# Patient Record
Sex: FEMALE | Race: WHITE | NOT HISPANIC OR LATINO | Employment: OTHER | URBAN - METROPOLITAN AREA
[De-identification: names, ages, dates, MRNs, and addresses within clinical notes are randomized per-mention and may not be internally consistent; named-entity substitution may affect disease eponyms.]

---

## 2023-08-18 ENCOUNTER — HOSPITAL ENCOUNTER (OUTPATIENT)
Dept: DATA CONVERSION | Facility: HOSPITAL | Age: 73
Discharge: HOME | End: 2023-08-18
Payer: MEDICARE

## 2023-08-18 DIAGNOSIS — M81.0 AGE-RELATED OSTEOPOROSIS WITHOUT CURRENT PATHOLOGICAL FRACTURE: ICD-10-CM

## 2023-09-16 PROBLEM — M19.90 OSTEOARTHRITIS: Status: ACTIVE | Noted: 2023-09-16

## 2023-09-16 PROBLEM — R82.998 LEUKOCYTES IN URINE: Status: ACTIVE | Noted: 2023-09-16

## 2023-09-16 PROBLEM — M06.9 RHEUMATOID ARTHRITIS (MULTI): Status: ACTIVE | Noted: 2023-09-16

## 2023-09-16 PROBLEM — I10 HYPERTENSION: Status: ACTIVE | Noted: 2023-09-16

## 2023-09-16 PROBLEM — K21.9 GERD (GASTROESOPHAGEAL REFLUX DISEASE): Status: ACTIVE | Noted: 2023-09-16

## 2023-09-16 PROBLEM — K63.89 MASS OF CECUM: Status: ACTIVE | Noted: 2023-09-16

## 2023-09-16 PROBLEM — I47.9 PAROXYSMAL TACHYCARDIA (MULTI): Status: ACTIVE | Noted: 2023-09-16

## 2023-09-16 RX ORDER — OXYBUTYNIN CHLORIDE 15 MG/1
15 TABLET, EXTENDED RELEASE ORAL DAILY
COMMUNITY

## 2023-09-16 RX ORDER — LISINOPRIL 40 MG/1
40 TABLET ORAL DAILY
COMMUNITY
End: 2024-04-15 | Stop reason: SDUPTHER

## 2023-09-16 RX ORDER — MONTELUKAST SODIUM 4 MG/1
1 TABLET, CHEWABLE ORAL DAILY
COMMUNITY

## 2023-09-16 RX ORDER — CHOLECALCIFEROL (VITAMIN D3) 25 MCG
25 TABLET ORAL DAILY
COMMUNITY

## 2023-09-16 RX ORDER — TRAMADOL HYDROCHLORIDE 50 MG/1
TABLET ORAL
COMMUNITY
End: 2023-11-07 | Stop reason: ALTCHOICE

## 2023-09-16 RX ORDER — DICYCLOMINE HYDROCHLORIDE 10 MG/1
20 CAPSULE ORAL 3 TIMES DAILY
COMMUNITY

## 2023-09-16 RX ORDER — NAPROXEN SODIUM 220 MG/1
81 TABLET, FILM COATED ORAL DAILY
COMMUNITY

## 2023-09-16 RX ORDER — LANSOPRAZOLE 30 MG/1
30 TABLET, ORALLY DISINTEGRATING, DELAYED RELEASE ORAL DAILY
COMMUNITY
End: 2024-03-27 | Stop reason: SDUPTHER

## 2023-09-16 RX ORDER — METOPROLOL TARTRATE 50 MG/1
50 TABLET ORAL
COMMUNITY

## 2023-09-16 RX ORDER — LEFLUNOMIDE 20 MG/1
20 TABLET ORAL DAILY
COMMUNITY
Start: 2015-09-28

## 2023-09-16 RX ORDER — FERROUS SULFATE 325(65) MG
1 TABLET, DELAYED RELEASE (ENTERIC COATED) ORAL DAILY
COMMUNITY
End: 2023-11-07 | Stop reason: ALTCHOICE

## 2023-09-16 RX ORDER — FOLIC ACID 0.4 MG
0.4 TABLET ORAL DAILY
COMMUNITY

## 2023-09-16 RX ORDER — AMLODIPINE BESYLATE 5 MG/1
5 TABLET ORAL DAILY
COMMUNITY
End: 2024-04-15 | Stop reason: SDUPTHER

## 2023-09-16 RX ORDER — METHOTREXATE 2.5 MG/1
6 TABLET ORAL
COMMUNITY

## 2023-09-16 RX ORDER — MELOXICAM 15 MG/1
15 TABLET ORAL DAILY
COMMUNITY

## 2023-11-07 ENCOUNTER — OFFICE VISIT (OUTPATIENT)
Dept: PRIMARY CARE | Facility: CLINIC | Age: 73
End: 2023-11-07
Payer: MEDICARE

## 2023-11-07 VITALS
HEIGHT: 65 IN | SYSTOLIC BLOOD PRESSURE: 158 MMHG | BODY MASS INDEX: 23.99 KG/M2 | HEART RATE: 64 BPM | TEMPERATURE: 97.1 F | OXYGEN SATURATION: 96 % | DIASTOLIC BLOOD PRESSURE: 86 MMHG | WEIGHT: 144 LBS

## 2023-11-07 DIAGNOSIS — I47.9 PAROXYSMAL TACHYCARDIA (MULTI): ICD-10-CM

## 2023-11-07 DIAGNOSIS — M05.742 RHEUMATOID ARTHRITIS INVOLVING BOTH HANDS WITH POSITIVE RHEUMATOID FACTOR (MULTI): ICD-10-CM

## 2023-11-07 DIAGNOSIS — M05.741 RHEUMATOID ARTHRITIS INVOLVING BOTH HANDS WITH POSITIVE RHEUMATOID FACTOR (MULTI): ICD-10-CM

## 2023-11-07 DIAGNOSIS — I10 PRIMARY HYPERTENSION: Primary | ICD-10-CM

## 2023-11-07 PROBLEM — E03.9 HYPOTHYROIDISM: Status: ACTIVE | Noted: 2023-11-07

## 2023-11-07 PROBLEM — E78.00 PURE HYPERCHOLESTEROLEMIA: Status: ACTIVE | Noted: 2023-11-07

## 2023-11-07 PROBLEM — N39.46 MIXED STRESS AND URGE URINARY INCONTINENCE: Status: ACTIVE | Noted: 2023-11-07

## 2023-11-07 PROBLEM — E55.9 VITAMIN D DEFICIENCY: Status: ACTIVE | Noted: 2023-11-07

## 2023-11-07 PROBLEM — M06.9 RHEUMATOID ARTHRITIS INVOLVING BOTH HANDS (MULTI): Status: ACTIVE | Noted: 2023-11-07

## 2023-11-07 PROCEDURE — 3079F DIAST BP 80-89 MM HG: CPT | Performed by: INTERNAL MEDICINE

## 2023-11-07 PROCEDURE — 1036F TOBACCO NON-USER: CPT | Performed by: INTERNAL MEDICINE

## 2023-11-07 PROCEDURE — 3077F SYST BP >= 140 MM HG: CPT | Performed by: INTERNAL MEDICINE

## 2023-11-07 PROCEDURE — 1126F AMNT PAIN NOTED NONE PRSNT: CPT | Performed by: INTERNAL MEDICINE

## 2023-11-07 PROCEDURE — 1159F MED LIST DOCD IN RCRD: CPT | Performed by: INTERNAL MEDICINE

## 2023-11-07 PROCEDURE — 99213 OFFICE O/P EST LOW 20 MIN: CPT | Performed by: INTERNAL MEDICINE

## 2023-11-07 ASSESSMENT — ENCOUNTER SYMPTOMS
DEPRESSION: 0
LOSS OF SENSATION IN FEET: 0
OCCASIONAL FEELINGS OF UNSTEADINESS: 0

## 2023-11-07 ASSESSMENT — PATIENT HEALTH QUESTIONNAIRE - PHQ9
2. FEELING DOWN, DEPRESSED OR HOPELESS: NOT AT ALL
SUM OF ALL RESPONSES TO PHQ9 QUESTIONS 1 AND 2: 0
1. LITTLE INTEREST OR PLEASURE IN DOING THINGS: NOT AT ALL

## 2023-11-07 ASSESSMENT — PAIN SCALES - GENERAL: PAINLEVEL: 0-NO PAIN

## 2023-11-07 NOTE — PROGRESS NOTES
[facility]: [facility]  PROGRESS NOTE      Amanda Hopkins is a 73 y.o. female that is presenting today for BP fu.    Assessment/Plan   Diagnoses and all orders for this visit:  Primary hypertension    BP office readings continues to be slightly up  with current treatment with better home readings ( [facility])  Continue the same Tx meds and bring home readings with her next visit on the new BP machine (it's reading today is slightly higher than the office one).  Rheumatoid arthritis involving both hands with positive rheumatoid factor (CMS/HCC)    Fair control / under Rheum care  Paroxysmal tachycardia (CMS/HCC)     Stable and controlled   Subjective   Patient is here for FU on her HTN and reviewing her home BP readings it was in in 130's /70-80's with her old BP machine which broke down and have new one she brought with her but does not have home readings checked with it except for one reading 148/82. Been taking her BP meds as ordered.    Review of Systems   All pertinent POSITIVES as noted per HPI.  All other systems have been reviewed and are NEGATIVE and /or Noncontributory to this patient current visit or complaint.  Objective   Vitals:    11/07/23 1054   BP: 158/86   Pulse: 64   Temp: 36.2 °C (97.1 °F)   SpO2: 96%      Body mass index is 23.96 kg/m².  Physical Exam  Vitals and nursing note reviewed.   Constitutional:       Appearance: Normal appearance.   HENT:      Head: Normocephalic and atraumatic.   Neck:      Vascular: No carotid bruit.   Cardiovascular:      Rate and Rhythm: Normal rate and regular rhythm.      Pulses: Normal pulses.      Heart sounds: Normal heart sounds.   Pulmonary:      Effort: Pulmonary effort is normal.      Breath sounds: Normal breath sounds.   Abdominal:      General: Abdomen is flat. Bowel sounds are normal.      Palpations: Abdomen is soft.   Musculoskeletal:         General: No swelling. Normal range of motion.      Cervical back: Neck supple.  "  Lymphadenopathy:      Cervical: No cervical adenopathy.   Skin:     General: Skin is warm and dry.   Neurological:      Mental Status: She is alert.   Psychiatric:         Mood and Affect: Mood normal.     Diagnostic Results   Lab Results   Component Value Date    GLUCOSE 96 12/10/2020    CALCIUM 8.8 12/10/2020     12/10/2020    K 3.8 12/10/2020    CO2 26 12/10/2020     12/10/2020    BUN 15 12/10/2020    CREATININE 0.5 12/10/2020     No results found for: \"ALT\", \"AST\", \"GGT\", \"ALKPHOS\", \"BILITOT\"  Lab Results   Component Value Date    WBC 4.9 12/10/2020    HGB 11.1 (L) 12/10/2020    HCT 36.2 12/10/2020    MCV 98.4 12/10/2020     12/10/2020     No results found for: \"CHOL\"  No results found for: \"HDL\"  No results found for: \"LDLCALC\"  No results found for: \"TRIG\"  No components found for: \"CHOLHDL\"  No results found for: \"HGBA1C\"  Other labs not included in the list above were reviewed either before or during this encounter.    History    Past Medical History:   Diagnosis Date    Other specified diseases of intestine 01/27/2017    Colonic mass    Personal history of other diseases of the musculoskeletal system and connective tissue     History of osteoarthritis    Supraventricular tachycardia     Supraventricular tachycardia by ECG     Past Surgical History:   Procedure Laterality Date    OTHER SURGICAL HISTORY  01/27/2017    Laryngeal Surgery    OTHER SURGICAL HISTORY  03/11/2020    Colectomy    OTHER SURGICAL HISTORY  04/10/2017    Laparoscopy Partial Colectomy    TONSILLECTOMY  01/27/2017    Tonsillectomy    TOTAL ABDOMINAL HYSTERECTOMY W/ BILATERAL SALPINGOOPHORECTOMY  01/27/2017    Total Abdominal Hysterectomy With Removal Of Both Ovaries     Family History   Problem Relation Name Age of Onset    Other (Cerebral hemorrhage) Mother      Heart disease Father      Other (OHS) Brother       Social History     Socioeconomic History    Marital status:      Spouse name: Not on file    Number " of children: Not on file    Years of education: Not on file    Highest education level: Not on file   Occupational History    Not on file   Tobacco Use    Smoking status: Never     Passive exposure: Never    Smokeless tobacco: Never   Vaping Use    Vaping Use: Never used   Substance and Sexual Activity    Alcohol use: Yes    Drug use: Never    Sexual activity: Not on file   Other Topics Concern    Not on file   Social History Narrative    Not on file     Social Determinants of Health     Financial Resource Strain: Not on file   Food Insecurity: Not on file   Transportation Needs: Not on file   Physical Activity: Not on file   Stress: Not on file   Social Connections: Not on file   Intimate Partner Violence: Not on file   Housing Stability: Not on file     Allergies   Allergen Reactions    Morphine Unknown    Propoxyphene N-Acetaminophen Unknown     Current Outpatient Medications on File Prior to Visit   Medication Sig Dispense Refill    amLODIPine (Norvasc) 5 mg tablet Take 1 tablet (5 mg) by mouth once daily.      aspirin 81 mg chewable tablet Chew 1 tablet (81 mg) once daily.      cholecalciferol (Vitamin D3) 25 MCG (1000 UT) tablet Take 1 tablet (25 mcg) by mouth once daily.      colestipol (Colestid) 1 gram tablet Take 1 tablet (1 g) by mouth once daily.      dicyclomine (Bentyl) 10 mg capsule Take 2 capsules (20 mg) by mouth 3 times a day. prn      folic acid (Folvite) 400 mcg tablet Take 1 tablet (0.4 mg) by mouth once daily.      glucos sul 2KCl/msm/chond/C/Mn (GLUCOSAMINE CHONDROITIN ORAL) Take 1 tablet by mouth once daily. Glucosamine Chondr 1500 Complx      lansoprazole (Prevacid SoluTab) 30 mg disintegrating tablet Take 1 tablet (30 mg) by mouth once daily.      leflunomide (Arava) 20 mg tablet Take 1 tablet (20 mg) by mouth once daily.      lisinopril 40 mg tablet Take 1 tablet (40 mg) by mouth once daily.      meloxicam (Mobic) 15 mg tablet Take 1 tablet (15 mg) by mouth once daily.      methotrexate  (Trexall) 2.5 mg tablet Take 6 tablets (15 mg total) by mouth 1 (one) time per week.      metoprolol tartrate (Lopressor) 50 mg tablet Take 1 tablet by mouth 2 times a day with meals.      oxybutynin XL (Ditropan-XL) 15 mg 24 hr tablet Take 1 tablet (15 mg) by mouth once daily.      [DISCONTINUED] ferrous sulfate 325 (65 Fe) MG EC tablet Take 1 tablet (325 mg) by mouth once daily.      [DISCONTINUED] traMADol (Ultram) 50 mg tablet traMADol HCl - 50 MG Oral Tablet   Refills: 0       Active       No current facility-administered medications on file prior to visit.     Immunization History   Administered Date(s) Administered    Influenza, Unspecified 10/19/2009, 11/05/2012, 12/08/2020    Novel influenza-H1N1-09, preservative-free 11/22/2009    Pfizer Purple Cap SARS-CoV-2 03/23/2021, 04/13/2021    Zoster vaccine, recombinant, adult (SHINGRIX) 02/08/2021    Zoster, Unspecified 12/08/2020     Patient's medical history was reviewed and updated either before or during this encounter.       Sami Teran MD

## 2023-11-28 ENCOUNTER — LAB (OUTPATIENT)
Dept: LAB | Facility: LAB | Age: 73
End: 2023-11-28
Payer: MEDICARE

## 2023-11-28 DIAGNOSIS — E78.00 PURE HYPERCHOLESTEROLEMIA, UNSPECIFIED: ICD-10-CM

## 2023-11-28 DIAGNOSIS — R73.9 HYPERGLYCEMIA, UNSPECIFIED: ICD-10-CM

## 2023-11-28 DIAGNOSIS — R39.9 UNSPECIFIED SYMPTOMS AND SIGNS INVOLVING THE GENITOURINARY SYSTEM: ICD-10-CM

## 2023-11-28 DIAGNOSIS — E03.9 HYPOTHYROIDISM, UNSPECIFIED: ICD-10-CM

## 2023-11-28 DIAGNOSIS — Z01.89 ENCOUNTER FOR OTHER SPECIFIED SPECIAL EXAMINATIONS: Primary | ICD-10-CM

## 2023-11-28 DIAGNOSIS — E55.9 VITAMIN D DEFICIENCY, UNSPECIFIED: ICD-10-CM

## 2023-11-28 LAB
25(OH)D3 SERPL-MCNC: 30 NG/ML (ref 31–100)
ALBUMIN SERPL-MCNC: 4.5 G/DL (ref 3.5–5)
ALP BLD-CCNC: 35 U/L (ref 35–125)
ALT SERPL-CCNC: 16 U/L (ref 5–40)
ANION GAP SERPL CALC-SCNC: 12 MMOL/L
AST SERPL-CCNC: 20 U/L (ref 5–40)
BASOPHILS # BLD AUTO: 0.08 X10*3/UL (ref 0–0.1)
BASOPHILS NFR BLD AUTO: 1.7 %
BILIRUB DIRECT SERPL-MCNC: <0.2 MG/DL (ref 0–0.2)
BILIRUB SERPL-MCNC: 0.4 MG/DL (ref 0.1–1.2)
BUN SERPL-MCNC: 21 MG/DL (ref 8–25)
CALCIUM SERPL-MCNC: 9.2 MG/DL (ref 8.5–10.4)
CHLORIDE SERPL-SCNC: 105 MMOL/L (ref 97–107)
CHOLEST SERPL-MCNC: 229 MG/DL (ref 133–200)
CHOLEST/HDLC SERPL: 3.1 {RATIO}
CO2 SERPL-SCNC: 25 MMOL/L (ref 24–31)
CREAT SERPL-MCNC: 0.6 MG/DL (ref 0.4–1.6)
EOSINOPHIL # BLD AUTO: 0.27 X10*3/UL (ref 0–0.4)
EOSINOPHIL NFR BLD AUTO: 5.9 %
ERYTHROCYTE [DISTWIDTH] IN BLOOD BY AUTOMATED COUNT: 13.1 % (ref 11.5–14.5)
EST. AVERAGE GLUCOSE BLD GHB EST-MCNC: 97 MG/DL
GFR SERPL CREATININE-BSD FRML MDRD: >90 ML/MIN/1.73M*2
GLUCOSE SERPL-MCNC: 97 MG/DL (ref 65–99)
HBA1C MFR BLD: 5 %
HCT VFR BLD AUTO: 35.7 % (ref 36–46)
HDLC SERPL-MCNC: 73 MG/DL
HGB BLD-MCNC: 11.2 G/DL (ref 12–16)
IMM GRANULOCYTES # BLD AUTO: 0.01 X10*3/UL (ref 0–0.5)
IMM GRANULOCYTES NFR BLD AUTO: 0.2 % (ref 0–0.9)
LDLC SERPL CALC-MCNC: 140 MG/DL (ref 65–130)
LYMPHOCYTES # BLD AUTO: 1.69 X10*3/UL (ref 0.8–3)
LYMPHOCYTES NFR BLD AUTO: 36.8 %
MCH RBC QN AUTO: 30.3 PG (ref 26–34)
MCHC RBC AUTO-ENTMCNC: 31.4 G/DL (ref 32–36)
MCV RBC AUTO: 97 FL (ref 80–100)
MONOCYTES # BLD AUTO: 0.54 X10*3/UL (ref 0.05–0.8)
MONOCYTES NFR BLD AUTO: 11.8 %
NEUTROPHILS # BLD AUTO: 2 X10*3/UL (ref 1.6–5.5)
NEUTROPHILS NFR BLD AUTO: 43.6 %
NRBC BLD-RTO: 0 /100 WBCS (ref 0–0)
PLATELET # BLD AUTO: 245 X10*3/UL (ref 150–450)
POTASSIUM SERPL-SCNC: 4.3 MMOL/L (ref 3.4–5.1)
PROT SERPL-MCNC: 6.7 G/DL (ref 5.9–7.9)
RBC # BLD AUTO: 3.7 X10*6/UL (ref 4–5.2)
SODIUM SERPL-SCNC: 142 MMOL/L (ref 133–145)
TRIGL SERPL-MCNC: 81 MG/DL (ref 40–150)
TSH SERPL DL<=0.05 MIU/L-ACNC: 1.3 MIU/L (ref 0.27–4.2)
WBC # BLD AUTO: 4.6 X10*3/UL (ref 4.4–11.3)

## 2023-11-28 PROCEDURE — 80061 LIPID PANEL: CPT

## 2023-11-28 PROCEDURE — 36415 COLL VENOUS BLD VENIPUNCTURE: CPT

## 2023-11-28 PROCEDURE — 83036 HEMOGLOBIN GLYCOSYLATED A1C: CPT

## 2023-11-28 PROCEDURE — 82248 BILIRUBIN DIRECT: CPT

## 2023-11-28 PROCEDURE — 82306 VITAMIN D 25 HYDROXY: CPT

## 2023-11-28 PROCEDURE — 80053 COMPREHEN METABOLIC PANEL: CPT

## 2023-11-28 PROCEDURE — 85025 COMPLETE CBC W/AUTO DIFF WBC: CPT

## 2023-11-28 PROCEDURE — 84443 ASSAY THYROID STIM HORMONE: CPT

## 2023-12-05 ENCOUNTER — OFFICE VISIT (OUTPATIENT)
Dept: PRIMARY CARE | Facility: CLINIC | Age: 73
End: 2023-12-05
Payer: MEDICARE

## 2023-12-05 VITALS
BODY MASS INDEX: 24.49 KG/M2 | SYSTOLIC BLOOD PRESSURE: 154 MMHG | DIASTOLIC BLOOD PRESSURE: 73 MMHG | HEART RATE: 78 BPM | WEIGHT: 147 LBS | TEMPERATURE: 97.6 F | OXYGEN SATURATION: 96 % | HEIGHT: 65 IN

## 2023-12-05 DIAGNOSIS — I10 PRIMARY HYPERTENSION: ICD-10-CM

## 2023-12-05 DIAGNOSIS — Z01.818 PRE-OPERATIVE CLEARANCE: Primary | ICD-10-CM

## 2023-12-05 DIAGNOSIS — H02.403 PTOSIS OF BOTH EYELIDS: ICD-10-CM

## 2023-12-05 DIAGNOSIS — E78.00 PURE HYPERCHOLESTEROLEMIA: ICD-10-CM

## 2023-12-05 DIAGNOSIS — K21.9 GASTROESOPHAGEAL REFLUX DISEASE WITHOUT ESOPHAGITIS: ICD-10-CM

## 2023-12-05 PROCEDURE — 3077F SYST BP >= 140 MM HG: CPT | Performed by: INTERNAL MEDICINE

## 2023-12-05 PROCEDURE — 1159F MED LIST DOCD IN RCRD: CPT | Performed by: INTERNAL MEDICINE

## 2023-12-05 PROCEDURE — 1036F TOBACCO NON-USER: CPT | Performed by: INTERNAL MEDICINE

## 2023-12-05 PROCEDURE — 1126F AMNT PAIN NOTED NONE PRSNT: CPT | Performed by: INTERNAL MEDICINE

## 2023-12-05 PROCEDURE — 3078F DIAST BP <80 MM HG: CPT | Performed by: INTERNAL MEDICINE

## 2023-12-05 PROCEDURE — 99214 OFFICE O/P EST MOD 30 MIN: CPT | Performed by: INTERNAL MEDICINE

## 2023-12-05 PROCEDURE — 93010 ELECTROCARDIOGRAM REPORT: CPT | Performed by: INTERNAL MEDICINE

## 2023-12-05 ASSESSMENT — ENCOUNTER SYMPTOMS
DEPRESSION: 0
OCCASIONAL FEELINGS OF UNSTEADINESS: 0
LOSS OF SENSATION IN FEET: 0

## 2023-12-05 ASSESSMENT — PATIENT HEALTH QUESTIONNAIRE - PHQ9
2. FEELING DOWN, DEPRESSED OR HOPELESS: NOT AT ALL
1. LITTLE INTEREST OR PLEASURE IN DOING THINGS: NOT AT ALL
SUM OF ALL RESPONSES TO PHQ9 QUESTIONS 1 AND 2: 0

## 2023-12-05 ASSESSMENT — PAIN SCALES - GENERAL: PAINLEVEL: 0-NO PAIN

## 2023-12-05 NOTE — PATIENT INSTRUCTIONS
- Hold Meloxicam and low dose aspirin 2 weeks prior to surgery    -  Benign essential HTN   - Your blood pressure been mildly to moderately elevated at home and here in the office  - Continue Lisinopril 40 mg daily and will increase the Amlodipine to 10 mg ( take 2 tabs of the 5 mg ) is recommended at this time  - Please monitor your blood pressures at home ( once a week at different time of the day), and notify the office if you get numbers frequently above 150/90 OR below 110/60  - Continue with low salt diet (Dash diet) and exercise 30 Minutes 5 days a week .    Preventive Medicine:      Counseling:           BP Management:              Lifestyle Rcommendations:  Hypertension education             Weight Reduction Recommendations:  Weight-reducing diet education             Dietary Recommendations:  Dietary management education, guidance, and counseling             Physical Activity Recommendations:  Giving encouragement to exercise             Moderation of ETOH Consumption Recommendations:  Counseling about alcohol consumption             Hypertensive Follow-up in 3 weeks ( 1 weeks before your surgery)

## 2023-12-05 NOTE — PROGRESS NOTES
[facility]: [facility]  PHYSICAL EXAM      Amanda Hopkins is a 73 y.o. female that is presenting today for surgical clearance (Eye llids).    Assessment/Plan   Diagnoses and all orders for this visit:  Pre-operative clearance  -     ECG 12 lead (Clinic Performed)    Per the patient PMH, HPI and my evaluation the patient is low risk medically for this surgery/procedure.  Ptosis of both eyelids     Per ophthalmology team  Primary hypertension  Under control with current treatment       -     ECG 12 lead (Clinic Performed)  Pure hypercholesterolemia      Under control with current treatment   Gastroesophageal reflux disease without esophagitis      Under control with current treatment   Other orders  -     Follow Up In Primary Care; Future  Subjective   Patient is here today for Pre-Operative Evaluation:                                               Planned Surgery : Bilateral blepharoplasty upper and lower eyelids, Ptosis repair right eyelid                        Date of surgery : Jan 3rd 2024                                               Patient PMH: Denies any CAD _ , MI _ , CVA _ , CHF _ ,  DM _ , CKD _  Has HTN,                     Patients functional capacity is rated at 4 mets based on the patients reported activites                                                                                                              Recent Cardiac Testing OR Surgeries None -                                                                      Prior complications with Anesthesia or Surgery None-                                                           Referral letter requesting Clearance received Jose Smith -                                                          Denies any CP _ , SOB _ , Syncope _ , Palpitations _ , Cough _ ,                         Patient denies any symptoms or concerns at this time.                                                                 Patient denies any adverse  reactions to or concerns with his/her meds.           Review of Systems   All pertinent POSITIVES as noted per HPI.  All other systems have been reviewed and are NEGATIVE and /or Noncontributory to this patient current visit or complaint.  Objective   Vitals:    12/05/23 0908   BP: 154/73   Pulse: 78   Temp: 36.4 °C (97.6 °F)   SpO2: 96%     Body mass index is 24.46 kg/m².  Physical Exam  Vitals and nursing note reviewed.   Constitutional:       Appearance: Normal appearance.   HENT:      Head: Normocephalic and atraumatic.      Right Ear: Tympanic membrane, ear canal and external ear normal.      Left Ear: Tympanic membrane, ear canal and external ear normal.      Nose: Nose normal.      Mouth/Throat:      Mouth: Mucous membranes are moist.      Pharynx: Oropharynx is clear.   Eyes:      Extraocular Movements: Extraocular movements intact.      Conjunctiva/sclera: Conjunctivae normal.      Pupils: Pupils are equal, round, and reactive to light.   Neck:      Vascular: No carotid bruit.   Cardiovascular:      Rate and Rhythm: Normal rate and regular rhythm.      Pulses: Normal pulses.      Heart sounds: Normal heart sounds.   Pulmonary:      Effort: Pulmonary effort is normal.      Breath sounds: Normal breath sounds.   Abdominal:      General: Abdomen is flat. Bowel sounds are normal.      Palpations: Abdomen is soft.   Musculoskeletal:         General: No swelling. Normal range of motion.      Cervical back: Normal range of motion and neck supple.   Lymphadenopathy:      Cervical: No cervical adenopathy.   Skin:     General: Skin is warm and dry.   Neurological:      General: No focal deficit present.      Mental Status: She is alert and oriented to person, place, and time. Mental status is at baseline.   Psychiatric:         Mood and Affect: Mood normal.         Behavior: Behavior normal.     Diagnostic Results   Lab Results   Component Value Date    GLUCOSE 97 11/28/2023    CALCIUM 9.2 11/28/2023      "11/28/2023    K 4.3 11/28/2023    CO2 25 11/28/2023     11/28/2023    BUN 21 11/28/2023    CREATININE 0.60 11/28/2023     Lab Results   Component Value Date    ALT 16 11/28/2023    AST 20 11/28/2023    ALKPHOS 35 11/28/2023    BILITOT 0.4 11/28/2023     Lab Results   Component Value Date    WBC 4.6 11/28/2023    HGB 11.2 (L) 11/28/2023    HCT 35.7 (L) 11/28/2023    MCV 97 11/28/2023     11/28/2023     Lab Results   Component Value Date    CHOL 229 (H) 11/28/2023     Lab Results   Component Value Date    HDL 73.0 11/28/2023     Lab Results   Component Value Date    LDLCALC 140 (H) 11/28/2023     Lab Results   Component Value Date    TRIG 81 11/28/2023     No components found for: \"CHOLHDL\"  Lab Results   Component Value Date    HGBA1C 5.0 11/28/2023     Other labs not included in the list above were reviewed either before or during this encounter.    History   Past Medical History:   Diagnosis Date    Other specified diseases of intestine 01/27/2017    Colonic mass    Personal history of other diseases of the musculoskeletal system and connective tissue     History of osteoarthritis    Supraventricular tachycardia     Supraventricular tachycardia by ECG     Past Surgical History:   Procedure Laterality Date    OTHER SURGICAL HISTORY  01/27/2017    Laryngeal Surgery    OTHER SURGICAL HISTORY  03/11/2020    Colectomy    OTHER SURGICAL HISTORY  04/10/2017    Laparoscopy Partial Colectomy    TONSILLECTOMY  01/27/2017    Tonsillectomy    TOTAL ABDOMINAL HYSTERECTOMY W/ BILATERAL SALPINGOOPHORECTOMY  01/27/2017    Total Abdominal Hysterectomy With Removal Of Both Ovaries     Family History   Problem Relation Name Age of Onset    Other (Cerebral hemorrhage) Mother      Heart disease Father      Other (OHS) Brother       Social History     Socioeconomic History    Marital status:      Spouse name: Not on file    Number of children: Not on file    Years of education: Not on file    Highest education level: " Not on file   Occupational History    Not on file   Tobacco Use    Smoking status: Never     Passive exposure: Never    Smokeless tobacco: Never   Vaping Use    Vaping Use: Never used   Substance and Sexual Activity    Alcohol use: Yes    Drug use: Never    Sexual activity: Not on file   Other Topics Concern    Not on file   Social History Narrative    Not on file     Social Determinants of Health     Financial Resource Strain: Not on file   Food Insecurity: Not on file   Transportation Needs: Not on file   Physical Activity: Not on file   Stress: Not on file   Social Connections: Not on file   Intimate Partner Violence: Not on file   Housing Stability: Not on file     Allergies   Allergen Reactions    Morphine Unknown    Propoxyphene N-Acetaminophen Unknown     Current Outpatient Medications on File Prior to Visit   Medication Sig Dispense Refill    amLODIPine (Norvasc) 5 mg tablet Take 1 tablet (5 mg) by mouth once daily.      aspirin 81 mg chewable tablet Chew 1 tablet (81 mg) once daily.      cholecalciferol (Vitamin D3) 25 MCG (1000 UT) tablet Take 1 tablet (25 mcg) by mouth once daily.      colestipol (Colestid) 1 gram tablet Take 1 tablet (1 g) by mouth once daily.      dicyclomine (Bentyl) 10 mg capsule Take 2 capsules (20 mg) by mouth 3 times a day. prn      folic acid (Folvite) 400 mcg tablet Take 1 tablet (0.4 mg) by mouth once daily.      glucos sul 2KCl/msm/chond/C/Mn (GLUCOSAMINE CHONDROITIN ORAL) Take 1 tablet by mouth once daily. Glucosamine Chondr 1500 Complx      lansoprazole (Prevacid SoluTab) 30 mg disintegrating tablet Take 1 tablet (30 mg) by mouth once daily.      leflunomide (Arava) 20 mg tablet Take 1 tablet (20 mg) by mouth once daily.      lisinopril 40 mg tablet Take 1 tablet (40 mg) by mouth once daily.      meloxicam (Mobic) 15 mg tablet Take 1 tablet (15 mg) by mouth once daily.      methotrexate (Trexall) 2.5 mg tablet Take 6 tablets (15 mg total) by mouth 1 (one) time per week.       metoprolol tartrate (Lopressor) 50 mg tablet Take 1 tablet by mouth 2 times a day with meals.      oxybutynin XL (Ditropan-XL) 15 mg 24 hr tablet Take 1 tablet (15 mg) by mouth once daily.       No current facility-administered medications on file prior to visit.     Immunization History   Administered Date(s) Administered    Influenza, Unspecified 10/19/2009, 11/05/2012, 12/08/2020    Novel influenza-H1N1-09, preservative-free 11/22/2009    Pfizer Purple Cap SARS-CoV-2 03/23/2021, 04/13/2021    Zoster vaccine, recombinant, adult (SHINGRIX) 02/08/2021    Zoster, Unspecified 12/08/2020     Patient's medical history was reviewed and updated either before or during this encounter.       Sami Teran MD

## 2023-12-13 PROBLEM — I47.9 PAROXYSMAL TACHYCARDIA (MULTI): Status: RESOLVED | Noted: 2023-09-16 | Resolved: 2023-12-13

## 2023-12-18 ENCOUNTER — APPOINTMENT (OUTPATIENT)
Dept: PRIMARY CARE | Facility: CLINIC | Age: 73
End: 2023-12-18
Payer: MEDICARE

## 2023-12-21 ENCOUNTER — OFFICE VISIT (OUTPATIENT)
Dept: PRIMARY CARE | Facility: CLINIC | Age: 73
End: 2023-12-21
Payer: MEDICARE

## 2023-12-21 VITALS
HEART RATE: 83 BPM | DIASTOLIC BLOOD PRESSURE: 70 MMHG | SYSTOLIC BLOOD PRESSURE: 138 MMHG | HEIGHT: 65 IN | OXYGEN SATURATION: 96 % | WEIGHT: 145 LBS | TEMPERATURE: 97.2 F | BODY MASS INDEX: 24.16 KG/M2

## 2023-12-21 DIAGNOSIS — I10 PRIMARY HYPERTENSION: Primary | ICD-10-CM

## 2023-12-21 PROCEDURE — 99213 OFFICE O/P EST LOW 20 MIN: CPT | Performed by: INTERNAL MEDICINE

## 2023-12-21 PROCEDURE — 3075F SYST BP GE 130 - 139MM HG: CPT | Performed by: INTERNAL MEDICINE

## 2023-12-21 PROCEDURE — 1159F MED LIST DOCD IN RCRD: CPT | Performed by: INTERNAL MEDICINE

## 2023-12-21 PROCEDURE — 1036F TOBACCO NON-USER: CPT | Performed by: INTERNAL MEDICINE

## 2023-12-21 PROCEDURE — 3078F DIAST BP <80 MM HG: CPT | Performed by: INTERNAL MEDICINE

## 2023-12-21 PROCEDURE — 1126F AMNT PAIN NOTED NONE PRSNT: CPT | Performed by: INTERNAL MEDICINE

## 2023-12-21 ASSESSMENT — PAIN SCALES - GENERAL: PAINLEVEL: 0-NO PAIN

## 2023-12-21 ASSESSMENT — ENCOUNTER SYMPTOMS
LOSS OF SENSATION IN FEET: 0
OCCASIONAL FEELINGS OF UNSTEADINESS: 0
DEPRESSION: 0

## 2023-12-21 NOTE — PROGRESS NOTES
[facility]: [facility]  PROGRESS NOTE      Amanda Hopkins is a 73 y.o. female that is presenting today for No chief complaint on file..    Assessment/Plan   Diagnoses and all orders for this visit:  Primary hypertension     Under control with current treatment   Continue the same meds and can go ahead with surgery ( see Initial PATs note)    Other orders  -     Follow Up In Primary Care; Future  Subjective   - Patient is here today for  her FUV on her elevated BP after changing meds     - Patient denies any XX symptoms or concerns at this time.    - patient denies any XX  adverse reactions to or concerns with his/her meds.      Review of Systems   All pertinent POSITIVES as noted per HPI.  All other systems have been reviewed and are NEGATIVE and /or Noncontributory to this patient current visit or complaint.  Objective   There were no vitals filed for this visit.   There is no height or weight on file to calculate BMI.  Physical Exam  Vitals and nursing note reviewed.   Constitutional:       Appearance: Normal appearance.   HENT:      Head: Normocephalic and atraumatic.   Neck:      Vascular: No carotid bruit.   Cardiovascular:      Rate and Rhythm: Normal rate and regular rhythm.      Pulses: Normal pulses.      Heart sounds: Normal heart sounds.   Pulmonary:      Effort: Pulmonary effort is normal.      Breath sounds: Normal breath sounds.   Abdominal:      General: Abdomen is flat. Bowel sounds are normal.      Palpations: Abdomen is soft.   Musculoskeletal:         General: No swelling. Normal range of motion.      Cervical back: Neck supple.   Lymphadenopathy:      Cervical: No cervical adenopathy.   Skin:     General: Skin is warm and dry.   Neurological:      Mental Status: She is alert.   Psychiatric:         Mood and Affect: Mood normal.     Diagnostic Results   Lab Results   Component Value Date    GLUCOSE 97 11/28/2023    CALCIUM 9.2 11/28/2023     11/28/2023    K 4.3  "11/28/2023    CO2 25 11/28/2023     11/28/2023    BUN 21 11/28/2023    CREATININE 0.60 11/28/2023     Lab Results   Component Value Date    ALT 16 11/28/2023    AST 20 11/28/2023    ALKPHOS 35 11/28/2023    BILITOT 0.4 11/28/2023     Lab Results   Component Value Date    WBC 4.6 11/28/2023    HGB 11.2 (L) 11/28/2023    HCT 35.7 (L) 11/28/2023    MCV 97 11/28/2023     11/28/2023     Lab Results   Component Value Date    CHOL 229 (H) 11/28/2023     Lab Results   Component Value Date    HDL 73.0 11/28/2023     Lab Results   Component Value Date    LDLCALC 140 (H) 11/28/2023     Lab Results   Component Value Date    TRIG 81 11/28/2023     No components found for: \"CHOLHDL\"  Lab Results   Component Value Date    HGBA1C 5.0 11/28/2023     Other labs not included in the list above were reviewed either before or during this encounter.    History    Past Medical History:   Diagnosis Date    Other specified diseases of intestine 01/27/2017    Colonic mass    Paroxysmal tachycardia (CMS/HCC) 09/16/2023    Personal history of other diseases of the musculoskeletal system and connective tissue     History of osteoarthritis    Supraventricular tachycardia     Supraventricular tachycardia by ECG     Past Surgical History:   Procedure Laterality Date    OTHER SURGICAL HISTORY  01/27/2017    Laryngeal Surgery    OTHER SURGICAL HISTORY  03/11/2020    Colectomy    OTHER SURGICAL HISTORY  04/10/2017    Laparoscopy Partial Colectomy    TONSILLECTOMY  01/27/2017    Tonsillectomy    TOTAL ABDOMINAL HYSTERECTOMY W/ BILATERAL SALPINGOOPHORECTOMY  01/27/2017    Total Abdominal Hysterectomy With Removal Of Both Ovaries     Family History   Problem Relation Name Age of Onset    Other (Cerebral hemorrhage) Mother      Heart disease Father      Other (OHS) Brother       Social History     Socioeconomic History    Marital status:      Spouse name: Not on file    Number of children: Not on file    Years of education: Not on file "    Highest education level: Not on file   Occupational History    Not on file   Tobacco Use    Smoking status: Never     Passive exposure: Never    Smokeless tobacco: Never   Vaping Use    Vaping Use: Never used   Substance and Sexual Activity    Alcohol use: Yes    Drug use: Never    Sexual activity: Not on file   Other Topics Concern    Not on file   Social History Narrative    Not on file     Social Determinants of Health     Financial Resource Strain: Not on file   Food Insecurity: Not on file   Transportation Needs: Not on file   Physical Activity: Not on file   Stress: Not on file   Social Connections: Not on file   Intimate Partner Violence: Not on file   Housing Stability: Not on file     Allergies   Allergen Reactions    Morphine Unknown    Propoxyphene N-Acetaminophen Unknown     Current Outpatient Medications on File Prior to Visit   Medication Sig Dispense Refill    amLODIPine (Norvasc) 5 mg tablet Take 1 tablet (5 mg) by mouth once daily.      aspirin 81 mg chewable tablet Chew 1 tablet (81 mg) once daily.      cholecalciferol (Vitamin D3) 25 MCG (1000 UT) tablet Take 1 tablet (25 mcg) by mouth once daily.      colestipol (Colestid) 1 gram tablet Take 1 tablet (1 g) by mouth once daily.      dicyclomine (Bentyl) 10 mg capsule Take 2 capsules (20 mg) by mouth 3 times a day. prn      folic acid (Folvite) 400 mcg tablet Take 1 tablet (0.4 mg) by mouth once daily.      glucos sul 2KCl/msm/chond/C/Mn (GLUCOSAMINE CHONDROITIN ORAL) Take 1 tablet by mouth once daily. Glucosamine Chondr 1500 Complx      lansoprazole (Prevacid SoluTab) 30 mg disintegrating tablet Take 1 tablet (30 mg) by mouth once daily.      leflunomide (Arava) 20 mg tablet Take 1 tablet (20 mg) by mouth once daily.      lisinopril 40 mg tablet Take 1 tablet (40 mg) by mouth once daily.      meloxicam (Mobic) 15 mg tablet Take 1 tablet (15 mg) by mouth once daily.      methotrexate (Trexall) 2.5 mg tablet Take 6 tablets (15 mg total) by mouth  1 (one) time per week.      metoprolol tartrate (Lopressor) 50 mg tablet Take 1 tablet by mouth 2 times a day with meals.      oxybutynin XL (Ditropan-XL) 15 mg 24 hr tablet Take 1 tablet (15 mg) by mouth once daily.       No current facility-administered medications on file prior to visit.     Immunization History   Administered Date(s) Administered    Influenza, Unspecified 10/19/2009, 11/05/2012, 12/08/2020    Novel influenza-H1N1-09, preservative-free 11/22/2009    Pfizer Purple Cap SARS-CoV-2 03/23/2021, 04/13/2021    Zoster vaccine, recombinant, adult (SHINGRIX) 02/08/2021    Zoster, Unspecified 12/08/2020     Patient's medical history was reviewed and updated either before or during this encounter.       Sami Teran MD

## 2024-02-20 NOTE — PROGRESS NOTES
Subjective      Chief Complaint   Patient presents with    Follow-up     6 month follow up          She has a history of supraventricular tachycardia as well as hypertension.  She had AV oz reentry tachycardia ablation in 2020.  Is doing well and is active.  Is having trouble with the left hip and has decreased the activity.    She is not complaining of chest discomfort.  No complaints of PND or orthopnea.  The legs are not swelling on her.  NO palpitaions.  She has not felt the SVT  The BP is doing well           Review of Systems   Constitutional: Negative. Negative for chills and fever.   HENT: Negative.     Eyes: Negative.    Respiratory: Negative.  Negative for cough.    Endocrine: Negative.    Skin: Negative.    Musculoskeletal:  Positive for joint pain.   Gastrointestinal: Negative.    Genitourinary: Negative.    Neurological: Negative.    All other systems reviewed and are negative.       Past Surgical History:   Procedure Laterality Date    EYE SURGERY      OTHER SURGICAL HISTORY  01/27/2017    Laryngeal Surgery    OTHER SURGICAL HISTORY  03/11/2020    Colectomy    OTHER SURGICAL HISTORY  04/10/2017    Laparoscopy Partial Colectomy    TONSILLECTOMY  01/27/2017    Tonsillectomy    TOTAL ABDOMINAL HYSTERECTOMY W/ BILATERAL SALPINGOOPHORECTOMY  01/27/2017    Total Abdominal Hysterectomy With Removal Of Both Ovaries        Active Ambulatory Problems     Diagnosis Date Noted    GERD (gastroesophageal reflux disease) 09/16/2023    Primary hypertension 09/16/2023    Mass of cecum 09/16/2023    Osteoarthritis 09/16/2023    Rheumatoid arthritis (CMS/[facility]) 09/16/2023    Hypothyroidism 11/07/2023    Mixed stress and urge urinary incontinence 11/07/2023    Pure hypercholesterolemia 11/07/2023    Rheumatoid arthritis involving both hands (CMS/[facility]) 11/07/2023    Vitamin D deficiency 11/07/2023    SVT (supraventricular tachycardia) 02/21/2024     Resolved Ambulatory Problems     Diagnosis Date Noted    Paroxysmal  "tachycardia (CMS/HCC) 09/16/2023     Past Medical History:   Diagnosis Date    Other specified diseases of intestine 01/27/2017    Personal history of other diseases of the musculoskeletal system and connective tissue     Supraventricular tachycardia         Visit Vitals  /70   Pulse 70   Wt 66.7 kg (147 lb)   SpO2 97%   BMI 24.46 kg/m²   Smoking Status Never   BSA 1.75 m²        Objective     Constitutional:       Appearance: Healthy appearance.   Eyes:      Pupils: Pupils are equal, round, and reactive to light.   Neck:      Vascular: JVD normal.   Pulmonary:      Breath sounds: Normal breath sounds.   Cardiovascular:      PMI at left midclavicular line. Normal rate. Regular rhythm. Normal S1. Normal S2.       Murmurs: There is no murmur.      No gallop.  No click. No rub.   Pulses:     Intact distal pulses.   Edema:     Peripheral edema absent.   Abdominal:      Palpations: Abdomen is soft.      Tenderness: There is no abdominal tenderness.   Musculoskeletal:      Extremities: No clubbing present.Skin:     General: Skin is warm and dry.   Neurological:      General: No focal deficit present.            Lab Review:         Lab Results   Component Value Date    CHOL 229 (H) 11/28/2023     Lab Results   Component Value Date    HDL 73.0 11/28/2023     Lab Results   Component Value Date    LDLCALC 140 (H) 11/28/2023     Lab Results   Component Value Date    TRIG 81 11/28/2023     No components found for: \"CHOLHDL\"     Assessment/Plan     Primary hypertension  Is doing well and the BP is doing well.  NO angina and no chf.    SVT (supraventricular tachycardia)  Has not had the svt since the ablation     "

## 2024-02-21 ENCOUNTER — OFFICE VISIT (OUTPATIENT)
Dept: CARDIOLOGY | Facility: CLINIC | Age: 74
End: 2024-02-21
Payer: MEDICARE

## 2024-02-21 VITALS
HEART RATE: 70 BPM | BODY MASS INDEX: 24.46 KG/M2 | WEIGHT: 147 LBS | SYSTOLIC BLOOD PRESSURE: 128 MMHG | DIASTOLIC BLOOD PRESSURE: 70 MMHG | OXYGEN SATURATION: 97 %

## 2024-02-21 DIAGNOSIS — I10 PRIMARY HYPERTENSION: Primary | ICD-10-CM

## 2024-02-21 DIAGNOSIS — I47.10 SVT (SUPRAVENTRICULAR TACHYCARDIA) (CMS-HCC): ICD-10-CM

## 2024-02-21 PROCEDURE — 3074F SYST BP LT 130 MM HG: CPT | Performed by: INTERNAL MEDICINE

## 2024-02-21 PROCEDURE — 3078F DIAST BP <80 MM HG: CPT | Performed by: INTERNAL MEDICINE

## 2024-02-21 PROCEDURE — 99213 OFFICE O/P EST LOW 20 MIN: CPT | Performed by: INTERNAL MEDICINE

## 2024-02-21 PROCEDURE — 1036F TOBACCO NON-USER: CPT | Performed by: INTERNAL MEDICINE

## 2024-02-21 PROCEDURE — 1126F AMNT PAIN NOTED NONE PRSNT: CPT | Performed by: INTERNAL MEDICINE

## 2024-02-21 PROCEDURE — 1159F MED LIST DOCD IN RCRD: CPT | Performed by: INTERNAL MEDICINE

## 2024-02-21 ASSESSMENT — PAIN SCALES - GENERAL: PAINLEVEL: 0-NO PAIN

## 2024-02-21 ASSESSMENT — ENCOUNTER SYMPTOMS
CONSTITUTIONAL NEGATIVE: 1
COUGH: 0
RESPIRATORY NEGATIVE: 1
NEUROLOGICAL NEGATIVE: 1
EYES NEGATIVE: 1
FEVER: 0
ENDOCRINE NEGATIVE: 1
CHILLS: 0
GASTROINTESTINAL NEGATIVE: 1

## 2024-03-21 ENCOUNTER — APPOINTMENT (OUTPATIENT)
Dept: PRIMARY CARE | Facility: CLINIC | Age: 74
End: 2024-03-21
Payer: MEDICARE

## 2024-03-21 RX ORDER — CEPHALEXIN 500 MG/1
CAPSULE ORAL
COMMUNITY
Start: 2024-01-03 | End: 2024-03-27 | Stop reason: WASHOUT

## 2024-03-21 RX ORDER — NEOMYCIN SULFATE, POLYMYXIN B SULFATE, AND DEXAMETHASONE 3.5; 10000; 1 MG/G; [USP'U]/G; MG/G
OINTMENT OPHTHALMIC
COMMUNITY
Start: 2024-01-12 | End: 2024-03-27 | Stop reason: WASHOUT

## 2024-03-26 PROBLEM — R82.998 LEUKOCYTES IN URINE: Status: ACTIVE | Noted: 2023-11-28

## 2024-03-26 PROBLEM — M81.0 SENILE OSTEOPOROSIS: Status: ACTIVE | Noted: 2023-08-18

## 2024-03-26 PROBLEM — L23.7 CONTACT DERMATITIS DUE TO POISON IVY: Status: ACTIVE | Noted: 2024-03-26

## 2024-03-26 PROBLEM — H02.409 PTOSIS OF EYELID: Status: ACTIVE | Noted: 2024-03-26

## 2024-03-26 PROBLEM — R05.9 COUGH: Status: ACTIVE | Noted: 2024-03-26

## 2024-03-26 PROBLEM — I47.10 SUPRAVENTRICULAR TACHYCARDIA DETERMINED BY ELECTROCARDIOGRAPHY (CMS-HCC): Status: ACTIVE | Noted: 2022-11-11

## 2024-03-26 PROBLEM — J02.9 SORE THROAT: Status: ACTIVE | Noted: 2024-03-26

## 2024-03-26 RX ORDER — TRAMADOL HYDROCHLORIDE 50 MG/1
TABLET ORAL
COMMUNITY
End: 2024-03-27 | Stop reason: WASHOUT

## 2024-03-26 RX ORDER — HYDROCORTISONE AND IODOQUINOL 10; 10 MG/G; MG/G
CREAM TOPICAL
COMMUNITY
Start: 2017-03-31

## 2024-03-26 RX ORDER — HYDROCHLOROTHIAZIDE 25 MG/1
TABLET ORAL
COMMUNITY
End: 2024-05-01 | Stop reason: ALTCHOICE

## 2024-03-27 ENCOUNTER — OFFICE VISIT (OUTPATIENT)
Dept: PRIMARY CARE | Facility: CLINIC | Age: 74
End: 2024-03-27
Payer: MEDICARE

## 2024-03-27 VITALS
OXYGEN SATURATION: 97 % | HEART RATE: 71 BPM | HEIGHT: 65 IN | BODY MASS INDEX: 24.16 KG/M2 | SYSTOLIC BLOOD PRESSURE: 129 MMHG | DIASTOLIC BLOOD PRESSURE: 69 MMHG | TEMPERATURE: 96.8 F | WEIGHT: 145 LBS

## 2024-03-27 DIAGNOSIS — I47.10 SUPRAVENTRICULAR TACHYCARDIA DETERMINED BY ELECTROCARDIOGRAPHY (CMS-HCC): ICD-10-CM

## 2024-03-27 DIAGNOSIS — M05.741 RHEUMATOID ARTHRITIS INVOLVING BOTH HANDS WITH POSITIVE RHEUMATOID FACTOR (MULTI): ICD-10-CM

## 2024-03-27 DIAGNOSIS — R79.9 ABNORMAL FINDING OF BLOOD CHEMISTRY, UNSPECIFIED: ICD-10-CM

## 2024-03-27 DIAGNOSIS — E78.00 PURE HYPERCHOLESTEROLEMIA: ICD-10-CM

## 2024-03-27 DIAGNOSIS — Z11.59 NEED FOR HEPATITIS C SCREENING TEST: ICD-10-CM

## 2024-03-27 DIAGNOSIS — Z01.89 ENCOUNTER FOR ROUTINE LABORATORY TESTING: ICD-10-CM

## 2024-03-27 DIAGNOSIS — K21.9 GASTROESOPHAGEAL REFLUX DISEASE WITHOUT ESOPHAGITIS: ICD-10-CM

## 2024-03-27 DIAGNOSIS — I10 PRIMARY HYPERTENSION: Primary | ICD-10-CM

## 2024-03-27 DIAGNOSIS — E55.9 VITAMIN D DEFICIENCY: ICD-10-CM

## 2024-03-27 DIAGNOSIS — M05.742 RHEUMATOID ARTHRITIS INVOLVING BOTH HANDS WITH POSITIVE RHEUMATOID FACTOR (MULTI): ICD-10-CM

## 2024-03-27 PROCEDURE — 1159F MED LIST DOCD IN RCRD: CPT | Performed by: INTERNAL MEDICINE

## 2024-03-27 PROCEDURE — 3078F DIAST BP <80 MM HG: CPT | Performed by: INTERNAL MEDICINE

## 2024-03-27 PROCEDURE — 99213 OFFICE O/P EST LOW 20 MIN: CPT | Performed by: INTERNAL MEDICINE

## 2024-03-27 PROCEDURE — 3074F SYST BP LT 130 MM HG: CPT | Performed by: INTERNAL MEDICINE

## 2024-03-27 PROCEDURE — 1126F AMNT PAIN NOTED NONE PRSNT: CPT | Performed by: INTERNAL MEDICINE

## 2024-03-27 RX ORDER — LANSOPRAZOLE 30 MG/1
30 TABLET, ORALLY DISINTEGRATING, DELAYED RELEASE ORAL DAILY
Qty: 90 TABLET | Refills: 1 | Status: SHIPPED | OUTPATIENT
Start: 2024-03-27 | End: 2024-04-01 | Stop reason: ALTCHOICE

## 2024-03-27 ASSESSMENT — PAIN SCALES - GENERAL: PAINLEVEL: 0-NO PAIN

## 2024-03-27 ASSESSMENT — PATIENT HEALTH QUESTIONNAIRE - PHQ9
SUM OF ALL RESPONSES TO PHQ9 QUESTIONS 1 AND 2: 0
1. LITTLE INTEREST OR PLEASURE IN DOING THINGS: NOT AT ALL
2. FEELING DOWN, DEPRESSED OR HOPELESS: NOT AT ALL

## 2024-03-27 NOTE — PROGRESS NOTES
[facility]: [facility]  PROGRESS NOTE      Amanda Hopkins is a 73 y.o. female that is presenting today for Follow-up (3 month follow up).    Assessment/Plan   Diagnoses and all orders for this visit:  Primary hypertension     Under control with current treatment   Continue the same   -     Comprehensive Metabolic Panel; Future  Supraventricular tachycardia determined by electrocardiography  -     TSH with reflex to Free T4 if abnormal; Future  Gastroesophageal reflux disease without esophagitis  -     lansoprazole (Prevacid SoluTab) 30 mg disintegrating tablet; Take 1 tablet (30 mg) by mouth once daily.  Rheumatoid arthritis involving both hands with positive rheumatoid factor (CMS/HCC)     Stable / Followed by Rheum  -     Comprehensive Metabolic Panel; Future  -     CBC and Auto Differential; Future  -     Hemoglobin A1C; Future  -     TSH with reflex to Free T4 if abnormal; Future  Encounter for routine laboratory testing  -     Comprehensive Metabolic Panel; Future  -     CBC and Auto Differential; Future  -     Lipid Panel; Future  -     Hemoglobin A1C; Future  -     Vitamin D 25-Hydroxy,Total (for eval of Vitamin D levels); Future  -     TSH with reflex to Free T4 if abnormal; Future  Vitamin D deficiency  -     Vitamin D 25-Hydroxy,Total (for eval of Vitamin D levels); Future  Pure hypercholesterolemia  -     Lipid Panel; Future  Need for hepatitis C screening test  -     Hepatitis C antibody; Future  Abnormal finding of blood chemistry, unspecified  -     Hemoglobin A1C; Future  Other orders  -     Follow Up In Primary Care; Future  Subjective   - Patient is here today for 3 months FUV and refills, been doing well.      - Patient denies any symptoms or concerns at this time.    - patient denies any adverse reactions to or concerns with his/her meds.      Review of Systems   All pertinent POSITIVES as noted per HPI.  All other systems have been reviewed and are NEGATIVE and /or  "Noncontributory to this patient current visit or complaint.    Objective   Vitals:    03/27/24 0944   BP: 129/69   Pulse: 71   Temp: 36 °C (96.8 °F)   SpO2: 97%      Body mass index is 24.13 kg/m².  Physical Exam  Vitals and nursing note reviewed.   Constitutional:       Appearance: Normal appearance.   HENT:      Head: Normocephalic and atraumatic.   Neck:      Vascular: No carotid bruit.   Cardiovascular:      Rate and Rhythm: Normal rate and regular rhythm.      Pulses: Normal pulses.      Heart sounds: Normal heart sounds.   Pulmonary:      Effort: Pulmonary effort is normal.      Breath sounds: Normal breath sounds.   Abdominal:      General: Abdomen is flat. Bowel sounds are normal.      Palpations: Abdomen is soft.   Musculoskeletal:         General: No swelling. Normal range of motion.      Cervical back: Neck supple.   Lymphadenopathy:      Cervical: No cervical adenopathy.   Skin:     General: Skin is warm and dry.   Neurological:      Mental Status: She is alert.   Psychiatric:         Mood and Affect: Mood normal.       Diagnostic Results   Lab Results   Component Value Date    GLUCOSE 97 11/28/2023    CALCIUM 9.2 11/28/2023     11/28/2023    K 4.3 11/28/2023    CO2 25 11/28/2023     11/28/2023    BUN 21 11/28/2023    CREATININE 0.60 11/28/2023     Lab Results   Component Value Date    ALT 16 11/28/2023    AST 20 11/28/2023    ALKPHOS 35 11/28/2023    BILITOT 0.4 11/28/2023     Lab Results   Component Value Date    WBC 4.6 11/28/2023    HGB 11.2 (L) 11/28/2023    HCT 35.7 (L) 11/28/2023    MCV 97 11/28/2023     11/28/2023     Lab Results   Component Value Date    CHOL 229 (H) 11/28/2023     Lab Results   Component Value Date    HDL 73.0 11/28/2023     Lab Results   Component Value Date    LDLCALC 140 (H) 11/28/2023     Lab Results   Component Value Date    TRIG 81 11/28/2023     No components found for: \"CHOLHDL\"  Lab Results   Component Value Date    HGBA1C 5.0 11/28/2023     Other " labs not included in the list above were reviewed either before or during this encounter.    History    Past Medical History:   Diagnosis Date    Other specified diseases of intestine 01/27/2017    Colonic mass    Paroxysmal tachycardia (CMS/HCC) 09/16/2023    Personal history of other diseases of the musculoskeletal system and connective tissue     History of osteoarthritis    Supraventricular tachycardia     Supraventricular tachycardia by ECG     Past Surgical History:   Procedure Laterality Date    EYE SURGERY      OTHER SURGICAL HISTORY  01/27/2017    Laryngeal Surgery    OTHER SURGICAL HISTORY  03/11/2020    Colectomy    OTHER SURGICAL HISTORY  04/10/2017    Laparoscopy Partial Colectomy    TONSILLECTOMY  01/27/2017    Tonsillectomy    TOTAL ABDOMINAL HYSTERECTOMY W/ BILATERAL SALPINGOOPHORECTOMY  01/27/2017    Total Abdominal Hysterectomy With Removal Of Both Ovaries     Family History   Problem Relation Name Age of Onset    Other (Cerebral hemorrhage) Mother      Heart disease Father      Other (OHS) Brother       Social History     Socioeconomic History    Marital status:      Spouse name: Not on file    Number of children: Not on file    Years of education: Not on file    Highest education level: Not on file   Occupational History    Not on file   Tobacco Use    Smoking status: Never     Passive exposure: Never    Smokeless tobacco: Never   Vaping Use    Vaping Use: Never used   Substance and Sexual Activity    Alcohol use: Yes    Drug use: Never    Sexual activity: Not on file   Other Topics Concern    Not on file   Social History Narrative    Not on file     Social Determinants of Health     Financial Resource Strain: Not on file   Food Insecurity: Not on file   Transportation Needs: Not on file   Physical Activity: Not on file   Stress: Not on file   Social Connections: Not on file   Intimate Partner Violence: Not on file   Housing Stability: Not on file     Allergies   Allergen Reactions     Morphine Unknown    Propoxyphene N-Acetaminophen Unknown    Propoxyphene Nausea And Vomiting and Unknown     Current Outpatient Medications on File Prior to Visit   Medication Sig Dispense Refill    amLODIPine (Norvasc) 5 mg tablet Take 1 tablet (5 mg) by mouth once daily.      aspirin 81 mg chewable tablet Chew 1 tablet (81 mg) once daily.      cholecalciferol (Vitamin D3) 25 MCG (1000 UT) tablet Take 1 tablet (25 mcg) by mouth once daily.      colestipol (Colestid) 1 gram tablet Take 1 tablet (1 g) by mouth once daily.      dicyclomine (Bentyl) 10 mg capsule Take 2 capsules (20 mg) by mouth 3 times a day. prn      folic acid (Folvite) 400 mcg tablet Take 1 tablet (0.4 mg) by mouth once daily.      glucos sul 2KCl/msm/chond/C/Mn (GLUCOSAMINE CHONDROITIN ORAL) Take 1 tablet by mouth once daily. Glucosamine Chondr 1500 Complx      hydrocortisone-iodoquinoL (Dermazene) 1-1 % cream in packet Dermazene 1-1 % External Cream APPLY EVERY DAY AS DIRECTED Quantity: 28.4 Refills: 0 Berta FERRARA, Jose Angel Start : 31-Mar-2017 Active      lansoprazole (Prevacid SoluTab) 30 mg disintegrating tablet Take 1 tablet (30 mg) by mouth once daily.      leflunomide (Arava) 20 mg tablet Take 1 tablet (20 mg) by mouth once daily.      lisinopril 40 mg tablet Take 1 tablet (40 mg) by mouth once daily.      meloxicam (Mobic) 15 mg tablet Take 1 tablet (15 mg) by mouth once daily.      methotrexate (Trexall) 2.5 mg tablet Take 6 tablets (15 mg total) by mouth 1 (one) time per week.      metoprolol tartrate (Lopressor) 50 mg tablet Take 1 tablet by mouth 2 times a day with meals.      oxybutynin XL (Ditropan-XL) 15 mg 24 hr tablet Take 1 tablet (15 mg) by mouth once daily.      cephalexin (Keflex) 500 mg capsule Take 1 capsule twice a day. begin 1 day before surgery      certolizumab pegol (Cimzia) injection Inject 1 mg under the skin.      hydroCHLOROthiazide (HYDRODiuril) 25 mg tablet Take by mouth.      neomycin-polymyxin B-dexameth (Polydex) 3.5  mg/g-10,000 unit/g-0.1 % ointment ophthalmic ointment Apply a small amount on eyelid three times a day      traMADol (Ultram) 50 mg tablet Take by mouth.       No current facility-administered medications on file prior to visit.     Immunization History   Administered Date(s) Administered    Influenza, Unspecified 10/19/2009, 11/05/2012, 12/08/2020    Novel influenza-H1N1-09, preservative-free 11/22/2009    Pfizer Purple Cap SARS-CoV-2 03/23/2021, 04/13/2021    Pneumococcal, Unspecified 12/20/2018    Zoster vaccine, recombinant, adult (SHINGRIX) 12/08/2020, 02/08/2021    Zoster, Unspecified 12/08/2020     Patient's medical history was reviewed and updated either before or during this encounter.       Sami Teran MD

## 2024-04-01 ENCOUNTER — TELEPHONE (OUTPATIENT)
Dept: PRIMARY CARE | Facility: CLINIC | Age: 74
End: 2024-04-01
Payer: MEDICARE

## 2024-04-01 DIAGNOSIS — K21.9 GASTROESOPHAGEAL REFLUX DISEASE WITHOUT ESOPHAGITIS: Primary | ICD-10-CM

## 2024-04-01 RX ORDER — LANSOPRAZOLE 30 MG/1
30 CAPSULE, DELAYED RELEASE ORAL DAILY
Qty: 90 CAPSULE | Refills: 1 | Status: SHIPPED | OUTPATIENT
Start: 2024-04-01 | End: 2024-09-28

## 2024-04-01 NOTE — TELEPHONE ENCOUNTER
LMOM advising new Rx for lansoprazole capsules sent to [facility] [address], [place]

## 2024-04-15 DIAGNOSIS — I10 PRIMARY HYPERTENSION: Primary | ICD-10-CM

## 2024-04-15 RX ORDER — LISINOPRIL 40 MG/1
40 TABLET ORAL DAILY
Qty: 90 TABLET | Refills: 3 | Status: SHIPPED | OUTPATIENT
Start: 2024-04-15 | End: 2025-04-15

## 2024-04-15 RX ORDER — AMLODIPINE BESYLATE 5 MG/1
5 TABLET ORAL DAILY
Qty: 90 TABLET | Refills: 3 | Status: SHIPPED | OUTPATIENT
Start: 2024-04-15 | End: 2025-04-15

## 2024-05-01 ENCOUNTER — OFFICE VISIT (OUTPATIENT)
Dept: RHEUMATOLOGY | Facility: CLINIC | Age: 74
End: 2024-05-01
Payer: MEDICARE

## 2024-05-01 ENCOUNTER — LAB (OUTPATIENT)
Dept: LAB | Facility: LAB | Age: 74
End: 2024-05-01
Payer: MEDICARE

## 2024-05-01 VITALS — DIASTOLIC BLOOD PRESSURE: 78 MMHG | SYSTOLIC BLOOD PRESSURE: 140 MMHG | BODY MASS INDEX: 24.96 KG/M2 | WEIGHT: 150 LBS

## 2024-05-01 DIAGNOSIS — M06.9 RHEUMATOID ARTHRITIS, INVOLVING UNSPECIFIED SITE, UNSPECIFIED WHETHER RHEUMATOID FACTOR PRESENT (MULTI): Primary | ICD-10-CM

## 2024-05-01 DIAGNOSIS — M19.90 OSTEOARTHRITIS, UNSPECIFIED OSTEOARTHRITIS TYPE, UNSPECIFIED SITE: ICD-10-CM

## 2024-05-01 DIAGNOSIS — L98.9 SKIN LESION: ICD-10-CM

## 2024-05-01 DIAGNOSIS — M06.9 RHEUMATOID ARTHRITIS, INVOLVING UNSPECIFIED SITE, UNSPECIFIED WHETHER RHEUMATOID FACTOR PRESENT (MULTI): ICD-10-CM

## 2024-05-01 DIAGNOSIS — M81.0 SENILE OSTEOPOROSIS: ICD-10-CM

## 2024-05-01 LAB
ALBUMIN SERPL BCP-MCNC: 4.3 G/DL (ref 3.4–5)
ALP SERPL-CCNC: 35 U/L (ref 33–136)
ALT SERPL W P-5'-P-CCNC: 15 U/L (ref 7–45)
AST SERPL W P-5'-P-CCNC: 19 U/L (ref 9–39)
BILIRUB DIRECT SERPL-MCNC: 0.1 MG/DL (ref 0–0.3)
BILIRUB SERPL-MCNC: 0.5 MG/DL (ref 0–1.2)
CCP IGG SERPL-ACNC: <1 U/ML
CREAT SERPL-MCNC: 0.61 MG/DL (ref 0.5–1.05)
CRP SERPL-MCNC: 0.15 MG/DL
EGFRCR SERPLBLD CKD-EPI 2021: >90 ML/MIN/1.73M*2
PROT SERPL-MCNC: 6.6 G/DL (ref 6.4–8.2)
RHEUMATOID FACT SER NEPH-ACNC: <10 IU/ML (ref 0–15)

## 2024-05-01 PROCEDURE — 86140 C-REACTIVE PROTEIN: CPT

## 2024-05-01 PROCEDURE — 3077F SYST BP >= 140 MM HG: CPT | Performed by: INTERNAL MEDICINE

## 2024-05-01 PROCEDURE — 86038 ANTINUCLEAR ANTIBODIES: CPT

## 2024-05-01 PROCEDURE — 85025 COMPLETE CBC W/AUTO DIFF WBC: CPT

## 2024-05-01 PROCEDURE — 3078F DIAST BP <80 MM HG: CPT | Performed by: INTERNAL MEDICINE

## 2024-05-01 PROCEDURE — 99214 OFFICE O/P EST MOD 30 MIN: CPT | Performed by: INTERNAL MEDICINE

## 2024-05-01 PROCEDURE — 99204 OFFICE O/P NEW MOD 45 MIN: CPT | Performed by: INTERNAL MEDICINE

## 2024-05-01 PROCEDURE — 36415 COLL VENOUS BLD VENIPUNCTURE: CPT

## 2024-05-01 PROCEDURE — 86431 RHEUMATOID FACTOR QUANT: CPT

## 2024-05-01 PROCEDURE — 1159F MED LIST DOCD IN RCRD: CPT | Performed by: INTERNAL MEDICINE

## 2024-05-01 PROCEDURE — 86200 CCP ANTIBODY: CPT

## 2024-05-01 PROCEDURE — 80076 HEPATIC FUNCTION PANEL: CPT

## 2024-05-01 PROCEDURE — 82565 ASSAY OF CREATININE: CPT

## 2024-05-01 NOTE — PROGRESS NOTES
"NP  Previous treatment with Dr. Tam for RA.  Last visit in Feb.  Stable on Leflunomide / Methotrexate.      HPI - she is here with RA, dx 20 yrs ago.  She c/o L \"hip\" (lat buttock) pain - almost 1 yr ago, a dog knocked her over, and she bruised it.  She had PT and injection, but it still hurts.  She sees a chiro because she some mornings where she is \"crooked\" when she wakes up.  AM stiffness 15-30 min.  No other pain/swelling.  No fever, HA, mouth sores, raynauds, rash, CP, GI, or numbness/tingling.  Some TANG.  +sicca - she uses OTCs    FH - +arthritis.  No CTD  SH - nonsmoker.  EtOH 4/wk.   No marijuana/drugs    PE  Gen - NAD  HEENT - moist MM good salivary pooling  Neck - supple, no LAD  CV - RRR no r/m/g  Lungs - CTA  Abd - +BS  Extr - 2+ DP, PT, and rad pulses.  No c/c/e  Skin - approx 1 sm scaly excoriated area L post scalp.  No exudate.  Lg red patch post scalp ?birthmark  Psych - nl affect  Neuro - nl strength.  Reflexes 2+ symmetric  Msk - no synovitis.  Lg heberdons, bouchards, and 1st CMC squaring with mult angular deformities.  ?sm R wrist ganglion cyst    A/P - OA and h/o seropositive RA, doing well without evidence of inflammation but sig OA changes.  ?inflam OA   Pt c/o sicca sx but with moist MM today - could consider pilocarpine, etc, if sx incr  Cautioned pt about EtOH with her DMARDs  She has had 1 ft of colon removed - endometriosis.  On cholestipol for diarrhea d/t this with good reults  OP - on prolia, last dose in feb at [facility] - last DEXA 8/23  Check labs  Reeval 3 mo      "

## 2024-05-02 LAB
ANA SER QL HEP2 SUBST: NEGATIVE
BASOPHILS # BLD AUTO: 0.07 X10*3/UL (ref 0–0.1)
BASOPHILS NFR BLD AUTO: 1.2 %
EOSINOPHIL # BLD AUTO: 0.25 X10*3/UL (ref 0–0.4)
EOSINOPHIL NFR BLD AUTO: 4.1 %
ERYTHROCYTE [DISTWIDTH] IN BLOOD BY AUTOMATED COUNT: 13 % (ref 11.5–14.5)
HCT VFR BLD AUTO: 36.2 % (ref 36–46)
HGB BLD-MCNC: 11.1 G/DL (ref 12–16)
IMM GRANULOCYTES # BLD AUTO: 0.01 X10*3/UL (ref 0–0.5)
IMM GRANULOCYTES NFR BLD AUTO: 0.2 % (ref 0–0.9)
LYMPHOCYTES # BLD AUTO: 1.59 X10*3/UL (ref 0.8–3)
LYMPHOCYTES NFR BLD AUTO: 26.2 %
MCH RBC QN AUTO: 29.9 PG (ref 26–34)
MCHC RBC AUTO-ENTMCNC: 30.7 G/DL (ref 32–36)
MCV RBC AUTO: 98 FL (ref 80–100)
MONOCYTES # BLD AUTO: 0.68 X10*3/UL (ref 0.05–0.8)
MONOCYTES NFR BLD AUTO: 11.2 %
NEUTROPHILS # BLD AUTO: 3.48 X10*3/UL (ref 1.6–5.5)
NEUTROPHILS NFR BLD AUTO: 57.1 %
NRBC BLD-RTO: 0 /100 WBCS (ref 0–0)
PLATELET # BLD AUTO: 214 X10*3/UL (ref 150–450)
RBC # BLD AUTO: 3.71 X10*6/UL (ref 4–5.2)
WBC # BLD AUTO: 6.1 X10*3/UL (ref 4.4–11.3)

## 2024-07-26 DIAGNOSIS — M06.9 RHEUMATOID ARTHRITIS, INVOLVING UNSPECIFIED SITE, UNSPECIFIED WHETHER RHEUMATOID FACTOR PRESENT (MULTI): Primary | ICD-10-CM

## 2024-07-26 RX ORDER — FOLIC ACID 0.4 MG
0.4 TABLET ORAL DAILY
Qty: 30 TABLET | Refills: 2 | Status: SHIPPED | OUTPATIENT
Start: 2024-07-26

## 2024-08-07 ENCOUNTER — LAB (OUTPATIENT)
Dept: LAB | Facility: LAB | Age: 74
End: 2024-08-07
Payer: MEDICARE

## 2024-08-07 ENCOUNTER — OFFICE VISIT (OUTPATIENT)
Dept: RHEUMATOLOGY | Facility: CLINIC | Age: 74
End: 2024-08-07
Payer: MEDICARE

## 2024-08-07 VITALS — WEIGHT: 147 LBS | SYSTOLIC BLOOD PRESSURE: 130 MMHG | BODY MASS INDEX: 24.46 KG/M2 | DIASTOLIC BLOOD PRESSURE: 80 MMHG

## 2024-08-07 DIAGNOSIS — M06.9 RHEUMATOID ARTHRITIS, INVOLVING UNSPECIFIED SITE, UNSPECIFIED WHETHER RHEUMATOID FACTOR PRESENT (MULTI): Primary | ICD-10-CM

## 2024-08-07 DIAGNOSIS — M19.90 OSTEOARTHRITIS, UNSPECIFIED OSTEOARTHRITIS TYPE, UNSPECIFIED SITE: ICD-10-CM

## 2024-08-07 DIAGNOSIS — M81.8 OTHER OSTEOPOROSIS WITHOUT CURRENT PATHOLOGICAL FRACTURE: ICD-10-CM

## 2024-08-07 DIAGNOSIS — M06.9 RHEUMATOID ARTHRITIS, INVOLVING UNSPECIFIED SITE, UNSPECIFIED WHETHER RHEUMATOID FACTOR PRESENT (MULTI): ICD-10-CM

## 2024-08-07 DIAGNOSIS — M81.0 SENILE OSTEOPOROSIS: ICD-10-CM

## 2024-08-07 DIAGNOSIS — M54.50 LOW BACK PAIN, UNSPECIFIED BACK PAIN LATERALITY, UNSPECIFIED CHRONICITY, UNSPECIFIED WHETHER SCIATICA PRESENT: ICD-10-CM

## 2024-08-07 DIAGNOSIS — I10 PRIMARY HYPERTENSION: Primary | ICD-10-CM

## 2024-08-07 LAB
ALBUMIN SERPL BCP-MCNC: 4.3 G/DL (ref 3.4–5)
ALP SERPL-CCNC: 34 U/L (ref 33–136)
ALT SERPL W P-5'-P-CCNC: 14 U/L (ref 7–45)
ANION GAP SERPL CALC-SCNC: 12 MMOL/L (ref 10–20)
AST SERPL W P-5'-P-CCNC: 16 U/L (ref 9–39)
BASOPHILS # BLD AUTO: 0.09 X10*3/UL (ref 0–0.1)
BASOPHILS NFR BLD AUTO: 1.7 %
BILIRUB SERPL-MCNC: 0.5 MG/DL (ref 0–1.2)
BUN SERPL-MCNC: 17 MG/DL (ref 6–23)
CALCIUM SERPL-MCNC: 9.3 MG/DL (ref 8.6–10.6)
CHLORIDE SERPL-SCNC: 105 MMOL/L (ref 98–107)
CO2 SERPL-SCNC: 29 MMOL/L (ref 21–32)
CREAT SERPL-MCNC: 0.64 MG/DL (ref 0.5–1.05)
CRP SERPL-MCNC: 0.12 MG/DL
EGFRCR SERPLBLD CKD-EPI 2021: >90 ML/MIN/1.73M*2
EOSINOPHIL # BLD AUTO: 0.27 X10*3/UL (ref 0–0.4)
EOSINOPHIL NFR BLD AUTO: 5.2 %
ERYTHROCYTE [DISTWIDTH] IN BLOOD BY AUTOMATED COUNT: 13.3 % (ref 11.5–14.5)
GLUCOSE SERPL-MCNC: 90 MG/DL (ref 74–99)
HCT VFR BLD AUTO: 35.8 % (ref 36–46)
HGB BLD-MCNC: 11.4 G/DL (ref 12–16)
IMM GRANULOCYTES # BLD AUTO: 0.01 X10*3/UL (ref 0–0.5)
IMM GRANULOCYTES NFR BLD AUTO: 0.2 % (ref 0–0.9)
LYMPHOCYTES # BLD AUTO: 1.78 X10*3/UL (ref 0.8–3)
LYMPHOCYTES NFR BLD AUTO: 34.6 %
MCH RBC QN AUTO: 30.6 PG (ref 26–34)
MCHC RBC AUTO-ENTMCNC: 31.8 G/DL (ref 32–36)
MCV RBC AUTO: 96 FL (ref 80–100)
MONOCYTES # BLD AUTO: 0.63 X10*3/UL (ref 0.05–0.8)
MONOCYTES NFR BLD AUTO: 12.2 %
NEUTROPHILS # BLD AUTO: 2.37 X10*3/UL (ref 1.6–5.5)
NEUTROPHILS NFR BLD AUTO: 46.1 %
NRBC BLD-RTO: 0 /100 WBCS (ref 0–0)
PLATELET # BLD AUTO: 234 X10*3/UL (ref 150–450)
POTASSIUM SERPL-SCNC: 4.4 MMOL/L (ref 3.5–5.3)
PROT SERPL-MCNC: 6.9 G/DL (ref 6.4–8.2)
RBC # BLD AUTO: 3.72 X10*6/UL (ref 4–5.2)
SODIUM SERPL-SCNC: 142 MMOL/L (ref 136–145)
WBC # BLD AUTO: 5.2 X10*3/UL (ref 4.4–11.3)

## 2024-08-07 PROCEDURE — 36415 COLL VENOUS BLD VENIPUNCTURE: CPT

## 2024-08-07 PROCEDURE — 86140 C-REACTIVE PROTEIN: CPT

## 2024-08-07 PROCEDURE — 80053 COMPREHEN METABOLIC PANEL: CPT

## 2024-08-07 PROCEDURE — 99214 OFFICE O/P EST MOD 30 MIN: CPT | Performed by: INTERNAL MEDICINE

## 2024-08-07 PROCEDURE — 85025 COMPLETE CBC W/AUTO DIFF WBC: CPT

## 2024-08-07 PROCEDURE — 3075F SYST BP GE 130 - 139MM HG: CPT | Performed by: INTERNAL MEDICINE

## 2024-08-07 PROCEDURE — 3079F DIAST BP 80-89 MM HG: CPT | Performed by: INTERNAL MEDICINE

## 2024-08-07 RX ORDER — ALBUTEROL SULFATE 0.83 MG/ML
3 SOLUTION RESPIRATORY (INHALATION) AS NEEDED
OUTPATIENT
Start: 2024-08-08

## 2024-08-07 RX ORDER — EPINEPHRINE 0.3 MG/.3ML
0.3 INJECTION SUBCUTANEOUS EVERY 5 MIN PRN
OUTPATIENT
Start: 2024-08-08

## 2024-08-07 RX ORDER — DIPHENHYDRAMINE HYDROCHLORIDE 50 MG/ML
50 INJECTION INTRAMUSCULAR; INTRAVENOUS AS NEEDED
OUTPATIENT
Start: 2024-08-08

## 2024-08-07 RX ORDER — METOPROLOL TARTRATE 50 MG/1
50 TABLET ORAL
Qty: 180 TABLET | Refills: 3 | Status: SHIPPED | OUTPATIENT
Start: 2024-08-07 | End: 2025-08-07

## 2024-08-07 RX ORDER — FAMOTIDINE 10 MG/ML
20 INJECTION INTRAVENOUS ONCE AS NEEDED
OUTPATIENT
Start: 2024-08-08

## 2024-08-07 NOTE — PROGRESS NOTES
"Recheck  OA  /  RA  /  OP  ( due for Prolia - [place] )  Doing well.   Ortho eval and chiropractor care for left hip and low back pain after fall x 1 year ago.  ? Who to see now.    HPI - she fell onto her L side when she was walking her son's dog 1 yr ago.  She saw ortho and had neg xray.  She had PT, which didn't help much.  Troch injection only helped for 1d.  She now has LBP, and it's difficult to bend over.  She has seen chiro.  Her sister recommended Marycruz Reggie who does a \"wellness connection\"  She has sl L troch pain.  No other pain.  She states that all her hand joint joints are swollen - points to heberdons and bouchards.  AM stiffness in her back.  No CP or resp.  Occ diarrhea with fried food.      PE  NAD  RRR no r/m/g  CTA  No edema  No synovitis  +heberdons, bouchards, and 1st CMC squaring    A/P - pt with OA and \"seropositive RA\" per prev rheum, but she is RF and CCP Ab neg, and joints look more like inflam OA.  No active inflammation  OP - due for prolia.  Last DEXA 8/23  Reviewed prev lab  Check labs  Pain mgmt for chronic back pain  Follow up 3 mo      "

## 2024-08-22 ENCOUNTER — OFFICE VISIT (OUTPATIENT)
Dept: PAIN MEDICINE | Facility: CLINIC | Age: 74
End: 2024-08-22
Payer: MEDICARE

## 2024-08-22 ENCOUNTER — HOSPITAL ENCOUNTER (OUTPATIENT)
Dept: RADIOLOGY | Facility: CLINIC | Age: 74
Discharge: HOME | End: 2024-08-22
Payer: MEDICARE

## 2024-08-22 VITALS
BODY MASS INDEX: 23.66 KG/M2 | RESPIRATION RATE: 18 BRPM | SYSTOLIC BLOOD PRESSURE: 152 MMHG | HEART RATE: 85 BPM | HEIGHT: 65 IN | DIASTOLIC BLOOD PRESSURE: 88 MMHG | WEIGHT: 142 LBS

## 2024-08-22 DIAGNOSIS — M54.50 CHRONIC BILATERAL LOW BACK PAIN WITHOUT SCIATICA: ICD-10-CM

## 2024-08-22 DIAGNOSIS — M54.50 CHRONIC BILATERAL LOW BACK PAIN WITHOUT SCIATICA: Primary | ICD-10-CM

## 2024-08-22 DIAGNOSIS — G89.29 CHRONIC BILATERAL LOW BACK PAIN WITHOUT SCIATICA: Primary | ICD-10-CM

## 2024-08-22 DIAGNOSIS — G89.29 CHRONIC BILATERAL LOW BACK PAIN WITHOUT SCIATICA: ICD-10-CM

## 2024-08-22 PROCEDURE — 1160F RVW MEDS BY RX/DR IN RCRD: CPT | Performed by: STUDENT IN AN ORGANIZED HEALTH CARE EDUCATION/TRAINING PROGRAM

## 2024-08-22 PROCEDURE — 3008F BODY MASS INDEX DOCD: CPT | Performed by: STUDENT IN AN ORGANIZED HEALTH CARE EDUCATION/TRAINING PROGRAM

## 2024-08-22 PROCEDURE — 3079F DIAST BP 80-89 MM HG: CPT | Performed by: STUDENT IN AN ORGANIZED HEALTH CARE EDUCATION/TRAINING PROGRAM

## 2024-08-22 PROCEDURE — 72114 X-RAY EXAM L-S SPINE BENDING: CPT

## 2024-08-22 PROCEDURE — 99204 OFFICE O/P NEW MOD 45 MIN: CPT | Performed by: STUDENT IN AN ORGANIZED HEALTH CARE EDUCATION/TRAINING PROGRAM

## 2024-08-22 PROCEDURE — 3077F SYST BP >= 140 MM HG: CPT | Performed by: STUDENT IN AN ORGANIZED HEALTH CARE EDUCATION/TRAINING PROGRAM

## 2024-08-22 PROCEDURE — 1036F TOBACCO NON-USER: CPT | Performed by: STUDENT IN AN ORGANIZED HEALTH CARE EDUCATION/TRAINING PROGRAM

## 2024-08-22 PROCEDURE — 1159F MED LIST DOCD IN RCRD: CPT | Performed by: STUDENT IN AN ORGANIZED HEALTH CARE EDUCATION/TRAINING PROGRAM

## 2024-08-22 PROCEDURE — 99214 OFFICE O/P EST MOD 30 MIN: CPT | Performed by: STUDENT IN AN ORGANIZED HEALTH CARE EDUCATION/TRAINING PROGRAM

## 2024-08-22 PROCEDURE — 1125F AMNT PAIN NOTED PAIN PRSNT: CPT | Performed by: STUDENT IN AN ORGANIZED HEALTH CARE EDUCATION/TRAINING PROGRAM

## 2024-08-22 RX ORDER — FERROUS SULFATE 325(65) MG
325 TABLET ORAL
COMMUNITY

## 2024-08-22 ASSESSMENT — PAIN SCALES - GENERAL
PAINLEVEL: 1
PAINLEVEL_OUTOF10: 1

## 2024-08-22 ASSESSMENT — PATIENT HEALTH QUESTIONNAIRE - PHQ9
1. LITTLE INTEREST OR PLEASURE IN DOING THINGS: NOT AT ALL
SUM OF ALL RESPONSES TO PHQ9 QUESTIONS 1 AND 2: 0
2. FEELING DOWN, DEPRESSED OR HOPELESS: NOT AT ALL

## 2024-08-22 ASSESSMENT — PAIN DESCRIPTION - DESCRIPTORS: DESCRIPTORS: SHOOTING;SHARP;ACHING

## 2024-08-22 ASSESSMENT — PAIN - FUNCTIONAL ASSESSMENT: PAIN_FUNCTIONAL_ASSESSMENT: 0-10

## 2024-08-22 ASSESSMENT — LIFESTYLE VARIABLES: TOTAL SCORE: 7

## 2024-08-22 NOTE — PROGRESS NOTES
[facility]  New Patient Office Visit Note 2024    Patient Information: Amanda Hopkins, MRN: [id number], : 1950   Primary Care/Referring Physician: Sami Teran MD, [address] [place] [address] / [place] [address]     Chief Complaint: Low back and left lateral hip pain  History of Pain: Ms. Amanda Hopkins is a 73 y.o. female with a PMHx of HTN, HLD, GERD, rheumatoid arthritis who presents for low back and left lateral hip pain.    She reports onset of pain around 1.5 years ago after a fall where she landed on her left hip. She was initially only having pain in her left lateral hip pain, but more recently has been having pain in a band in her low back. Currently the low back is bothering her more than the left lateral hip. Her pain and stiffness is worse in the morning but also worsens throughout the day if she is more active. She has a lot of pain with bending forward and picking things up off the ground. She denies any radicular pain in her legs. She denies any numbness, tingling, or weakness in the legs. She was told she likely has greater trochanteric bursitis, but tried a left GTB injection in the past with no benefit.     Current Pain Medications: Very occasional OTC Aleve  Previously Tried Pain Medications: None    Relevant Surgeries: Denies lumbar spine or hip injections  Injections: Underwent a left GTB injection without much relief  Physical/Occupational Therapy: Has recently completed PT and has done chiropractic adjustment and acupuncture    Medications:   Current Outpatient Medications   Medication Instructions    amLODIPine (NORVASC) 5 mg, oral, Daily    aspirin 81 mg, oral, Daily    cholecalciferol (VITAMIN D3) 25 mcg, oral, Daily    colestipol (COLESTID) 1 g, oral, Daily    dicyclomine (BENTYL) 20 mg, oral, 3 times daily, prn    ferrous sulfate (325 mg ferrous sulfate) 325 mg, oral, Daily with breakfast    folic acid (FOLVITE) 0.4 mg, oral, Daily    glucos sul 2KCl/msm/chond/C/Mn  (GLUCOSAMINE CHONDROITIN ORAL) 1 tablet, oral, Daily, Glucosamine Chondr 1500 Complx    hydrocortisone-iodoquinoL (Dermazene) 1-1 % cream in packet Dermazene 1-1 % External Cream APPLY EVERY DAY AS DIRECTED Quantity: 28.4 Refills: 0 Jose Angel Hampton MD Start : 31-Mar-2017 Active    lansoprazole (PREVACID) 30 mg, oral, Daily, Do not crush or chew.    leflunomide (ARAVA) 20 mg, oral, Daily    lisinopril 40 mg, oral, Daily    meloxicam (MOBIC) 15 mg, oral, Daily    methotrexate (Trexall) 2.5 mg tablet 6 tablets, oral, Once Weekly    metoprolol tartrate (LOPRESSOR) 50 mg, oral, 2 times daily (morning and late afternoon)    oxybutynin XL (DITROPAN-XL) 15 mg, oral, Daily      Allergies:   Allergies   Allergen Reactions    Morphine Unknown    Propoxyphene N-Acetaminophen Unknown    Propoxyphene Nausea And Vomiting and Unknown       Past Medical & Surgical History:  Past Medical History:   Diagnosis Date    Other specified diseases of intestine 01/27/2017    Colonic mass    Paroxysmal tachycardia (Multi) 09/16/2023    Personal history of other diseases of the musculoskeletal system and connective tissue     History of osteoarthritis    Supraventricular tachycardia (CMS-HCC)     Supraventricular tachycardia by ECG      Past Surgical History:   Procedure Laterality Date    EYE SURGERY      OTHER SURGICAL HISTORY  01/27/2017    Laryngeal Surgery    OTHER SURGICAL HISTORY  03/11/2020    Colectomy    OTHER SURGICAL HISTORY  04/10/2017    Laparoscopy Partial Colectomy    TONSILLECTOMY  01/27/2017    Tonsillectomy    TOTAL ABDOMINAL HYSTERECTOMY W/ BILATERAL SALPINGOOPHORECTOMY  01/27/2017    Total Abdominal Hysterectomy With Removal Of Both Ovaries       Family History   Problem Relation Name Age of Onset    Other (Cerebral hemorrhage) Mother      Heart disease Father      Other (OHS) Brother       Social History     Socioeconomic History    Marital status:      Spouse name: Not on file    Number of children: Not on file     "Years of education: Not on file    Highest education level: Not on file   Occupational History    Not on file   Tobacco Use    Smoking status: Never     Passive exposure: Never    Smokeless tobacco: Never   Vaping Use    Vaping status: Never Used   Substance and Sexual Activity    Alcohol use: Yes    Drug use: Never    Sexual activity: Not on file   Other Topics Concern    Not on file   Social History Narrative    Not on file     Social Determinants of Health     Financial Resource Strain: Not on file   Food Insecurity: Not on file   Transportation Needs: Not on file   Physical Activity: Not on file   Stress: Not on file   Social Connections: Not on file   Intimate Partner Violence: Not on file   Housing Stability: Not on file       Problems, Past medical history, past surgical history, Medications, allergies, social and family history reviewed and as per the electronic medical record from today's encounter    Review of Systems:  CONST: No fever, chills, fatigue, weight changes  EYES: No loss of vision  ENT: No hearing loss, tinnitus  CV: No chest pain, palpitations  RESP: No dyspnea, shortness of breath, cough  GI: No stool incontinence, nausea, vomiting  : No urinary incontinence  MSK: No joint swelling  SKIN: No rash, no hives  NEURO: No headache, dizziness  PSYCH: No anxiety, depression  HEM/LYMPH: No easy bruising or bleeding  All other systems reviewed are negative     Physical Exam:  Vitals: /88   Pulse 85   Resp 18   Ht 1.651 m (5' 5\")   Wt 64.4 kg (142 lb)   BMI 23.63 kg/m²   General: No apparent distress. Alert, appropriate, oriented x 3. Mood and affect normal. Speaking in full sentences.  HENT: Normocephalic, atraumatic. Hearing intact.  Eyes: Extraocular movements grossly intact. Pupils equal and round.   Neck: Supple, trachea midline.  Lungs: Symmetric respiratory excursion on visual exam, nonlabored breathing.  Extremities: No clubbing, cyanosis, or edema noted in arms or legs.  Skin: No " "rashes, lesions, alopecia noted on back or extremities.   Back: Reports pain with extension and lumbar facet loading maneuvers bilaterally. No tenderness over the spinous processes, SIJ, or GTB bilaterally. Reports pain to palpation of left gluteal muscles. No spasm noted over musculature. No misalignment or asymmetry noted.  Neuro: Alert and appropriate. Motor strength 5/5 throughout bilateral lower extremities. Sensory intact to light touch bilateral lower extremities. Gait within normal limits. Bulk and tone within normal limits.    Laboratory Data:  The following laboratory data were reviewed during this visit:   Lab Results   Component Value Date    WBC 5.2 08/07/2024    RBC 3.72 (L) 08/07/2024    HGB 11.4 (L) 08/07/2024    HCT 35.8 (L) 08/07/2024     08/07/2024      No results found for: \"INR\"  Lab Results   Component Value Date    CREATININE 0.64 08/07/2024    HGBA1C 5.0 11/28/2023       Imaging:  The following imaging impressions were reviewed by me during this visit:    No results found for this or any previous visit from the past 180 days.       I also personally reviewed the images from the above studies myself. These images and my interpretation of them contributed to the management and decision making of the patient's medical plan.    ASSESSMENT:  Ms. Amanda Hopkins is a 73 y.o. female with low back and left lateral hip pain that is consistent with:    1. Chronic bilateral low back pain without sciatica        PLAN:    Diagnostics:   - I recommend a lumbar spine xray, with consideration for lumbar spine MRI in the future    Physical Therapy and Rehabilitation:     - She has completed PT recently without durable improvement in her symptoms. She should maintain her regular HEP    Psychologically:  - No needs at this time    Medications  - No changes to medication today    Duration  - 1.5 years    Interventions:  - The exact etiology of her pain remains unclear, but may represent lumbar facet " arthropathy, lumbar radiculopathy, and possibly vertebrogenic low back pain. I will await additional diagnostic imaging prior to considering intervention        Sincerely,  Dino Carvajal MD  [facility] - [place]

## 2024-09-16 NOTE — PROGRESS NOTES
[facility]  Follow Up Office Visit Note 2024    Patient Information: Amanda Hopkins, MRN: [id number], : 1950   Primary Care/Referring Physician: Sami Teran MD, [address] [place] [address] / [place] [address]     Chief Complaint: Low back and left lateral hip pain    Interval History: Ms. Hopkins presents today for follow up. At her last visit I ordered a lumbar spine xray    Today she reports no major changes since last seen. Her pain remains manageable but she is still somewhat limited in her activity, especially things like picking up her grand kids. She continues to get chiropractic adjustments. I reviewed her lumbar spine xray in detail with her today.    Brief History of Pain: Ms. Amanda Hopkins is a 73 y.o. female with a PMHx of HTN, HLD, GERD, rheumatoid arthritis who presents for low back and left lateral hip pain.    She reports onset of pain around 1.5 years ago after a fall where she landed on her left hip. She was initially only having pain in her left lateral hip pain, but more recently has been having pain in a band in her low back. Currently the low back is bothering her more than the left lateral hip. Her pain and stiffness is worse in the morning but also worsens throughout the day if she is more active. She has a lot of pain with bending forward and picking things up off the ground. She denies any radicular pain in her legs. She denies any numbness, tingling, or weakness in the legs. She was told she likely has greater trochanteric bursitis, but tried a left GTB injection in the past with no benefit.     Current Pain Medications: Very occasional OTC Aleve  Previously Tried Pain Medications: None    Relevant Surgeries: Denies lumbar spine or hip injections  Injections: Underwent a left GTB injection without much relief  Physical/Occupational Therapy: Has recently completed PT and has done chiropractic adjustment and acupuncture    Medications:   Current Outpatient Medications    Medication Instructions    amLODIPine (NORVASC) 5 mg, oral, Daily    aspirin 81 mg, oral, Daily    cholecalciferol (VITAMIN D3) 25 mcg, oral, Daily    colestipol (COLESTID) 1 g, oral, Daily    dicyclomine (BENTYL) 20 mg, oral, 3 times daily, prn    ferrous sulfate (325 mg ferrous sulfate) 325 mg, oral, Daily with breakfast    folic acid (FOLVITE) 0.4 mg, oral, Daily    glucos sul 2KCl/msm/chond/C/Mn (GLUCOSAMINE CHONDROITIN ORAL) 1 tablet, oral, Daily, Glucosamine Chondr 1500 Complx    hydrocortisone-iodoquinoL (Dermazene) 1-1 % cream in packet Dermazene 1-1 % External Cream APPLY EVERY DAY AS DIRECTED Quantity: 28.4 Refills: 0 Berta FERRARA Jose Angel Start : 31-Mar-2017 Active    lansoprazole (PREVACID) 30 mg, oral, Daily, Do not crush or chew.    leflunomide (ARAVA) 20 mg, oral, Daily    lisinopril 40 mg, oral, Daily    meloxicam (MOBIC) 15 mg, oral, Daily    methotrexate (Trexall) 2.5 mg tablet 6 tablets, oral, Once Weekly    metoprolol tartrate (LOPRESSOR) 50 mg, oral, 2 times daily (morning and late afternoon)    oxybutynin XL (DITROPAN-XL) 15 mg, oral, Daily      Allergies:   Allergies   Allergen Reactions    Morphine Unknown    Propoxyphene N-Acetaminophen Unknown    Propoxyphene Nausea And Vomiting and Unknown       Past Medical & Surgical History:  Past Medical History:   Diagnosis Date    Other specified diseases of intestine 01/27/2017    Colonic mass    Paroxysmal tachycardia (Multi) 09/16/2023    Personal history of other diseases of the musculoskeletal system and connective tissue     History of osteoarthritis    Supraventricular tachycardia (CMS-HCC)     Supraventricular tachycardia by ECG      Past Surgical History:   Procedure Laterality Date    EYE SURGERY      OTHER SURGICAL HISTORY  01/27/2017    Laryngeal Surgery    OTHER SURGICAL HISTORY  03/11/2020    Colectomy    OTHER SURGICAL HISTORY  04/10/2017    Laparoscopy Partial Colectomy    TONSILLECTOMY  01/27/2017    Tonsillectomy    TOTAL ABDOMINAL  "HYSTERECTOMY W/ BILATERAL SALPINGOOPHORECTOMY  01/27/2017    Total Abdominal Hysterectomy With Removal Of Both Ovaries       Family History   Problem Relation Name Age of Onset    Other (Cerebral hemorrhage) Mother      Heart disease Father      Other (OHS) Brother       Social History     Socioeconomic History    Marital status:      Spouse name: Not on file    Number of children: Not on file    Years of education: Not on file    Highest education level: Not on file   Occupational History    Not on file   Tobacco Use    Smoking status: Never     Passive exposure: Never    Smokeless tobacco: Never   Vaping Use    Vaping status: Never Used   Substance and Sexual Activity    Alcohol use: Yes    Drug use: Never    Sexual activity: Not on file   Other Topics Concern    Not on file   Social History Narrative    Not on file     Social Determinants of Health     Financial Resource Strain: Not on file   Food Insecurity: Not on file   Transportation Needs: Not on file   Physical Activity: Not on file   Stress: Not on file   Social Connections: Not on file   Intimate Partner Violence: Not on file   Housing Stability: Not on file       Problems, Past medical history, past surgical history, Medications, allergies, social and family history reviewed and as per the electronic medical record from today's encounter    Review of Systems:  CONST: No fever, chills, fatigue, weight changes  EYES: No loss of vision  ENT: No hearing loss, tinnitus  CV: No chest pain, palpitations  RESP: No dyspnea, shortness of breath, cough  GI: No stool incontinence, nausea, vomiting  : No urinary incontinence  MSK: No joint swelling  SKIN: No rash, no hives  NEURO: No headache, dizziness  PSYCH: No anxiety, depression  HEM/LYMPH: No easy bruising or bleeding  All other systems reviewed are negative     Physical Exam:  Vitals: /82   Pulse 59   Resp 18   Ht 1.651 m (5' 5\")   Wt 64.4 kg (142 lb)   SpO2 96%   BMI 23.63 kg/m² " "  General: No apparent distress. Alert, appropriate, oriented x 3. Mood and affect normal. Speaking in full sentences.  HENT: Normocephalic, atraumatic. Hearing intact.  Eyes: Extraocular movements grossly intact. Pupils equal and round.   Neck: Supple, trachea midline.  Lungs: Symmetric respiratory excursion on visual exam, nonlabored breathing.  Extremities: No clubbing, cyanosis, or edema noted in arms or legs.  Skin: No rashes, lesions, alopecia noted on back or extremities.   Back: Reports pain with extension and lumbar facet loading maneuvers bilaterally. No tenderness over the spinous processes, SIJ, or GTB bilaterally. Reports pain to palpation of left gluteal muscles. No spasm noted over musculature. No misalignment or asymmetry noted.  Neuro: Alert and appropriate. Motor strength 5/5 throughout bilateral lower extremities. Sensory intact to light touch bilateral lower extremities. Gait within normal limits. Bulk and tone within normal limits.    Laboratory Data:  The following laboratory data were reviewed during this visit:   Lab Results   Component Value Date    WBC 5.2 08/07/2024    RBC 3.72 (L) 08/07/2024    HGB 11.4 (L) 08/07/2024    HCT 35.8 (L) 08/07/2024     08/07/2024      No results found for: \"INR\"  Lab Results   Component Value Date    CREATININE 0.64 08/07/2024    HGBA1C 5.0 11/28/2023       Imaging:  The following imaging impressions were reviewed by me during this visit:    -8/22/24 lumbar spine xray shows advanced grade 1 L4-5 anterolisthesis secondary to chronic L4 pars  defects. Severe discogenic and facet degenerative changes at L4-5 and L5-S1. Mild L3-4 degenerative changes. No significant dynamic instability on flexion extension views. Vertebral body heights are preserved    I also personally reviewed the images from the above studies myself. These images and my interpretation of them contributed to the management and decision making of the patient's medical " plan.    ASSESSMENT:  Ms. Amanda Hopkins is a 73 y.o. female with low back and left lateral hip pain that is consistent with:    1. Pars defect with spondylolisthesis    2. Lumbar facet arthropathy    3. DDD (degenerative disc disease), lumbar          PLAN:    Diagnostics:   - I reviewed her lumbar spine xray which shows pars defect at L4 with significant anterolisthesis at L4/5, DDD at L4/5, L5/S1, and facet arthropathy. If her pain worsens I would consider a lumbar spine MRI    Physical Therapy and Rehabilitation:     - She has completed PT recently without durable improvement in her symptoms. She should maintain her regular HEP. I gave her additional exercises focusing on strengthening of the low back muscles to help stabilize her spine. Given presence of anterolisthesis at L4/5 and pars defect at L4 I did warn her against any chiropractic manipulation which involves significant force on the low back    Psychologically:  - No needs at this time    Medications  - No changes to medication today. She continues to get some benefit from OTC Aleve and Ibuprofen    Duration  - 1.5 years    Interventions:  - The etiology of her pain is likely multi-factorial secondary to lumbar spinal stenosis,  lumbar facet arthropathy, and possibly vertebrogenic low back pain. Her pain is currently manageable but if it were to worsen I would consider an L5/S1 or bilateral L4/5, L5/S1 mbb's/RFA        Sincerely,  Dino Carvajal MD  [facility] - [place]

## 2024-09-18 ENCOUNTER — DOCUMENTATION (OUTPATIENT)
Dept: INFUSION THERAPY | Facility: CLINIC | Age: 74
End: 2024-09-18
Payer: MEDICARE

## 2024-09-18 NOTE — PROGRESS NOTES
"CLINICAL CLEARANCE FOR OUTPATIENT INJECTION      Patient to be scheduled for Continuation of Prolia injections. - OK per Dr. Mas to use 8/7/24 labs for Oct 2024 injection.     For Diagnosis: Osteoporosis    Labs..  Calcium drawn on:   Lab Results   Component Value Date    CALCIUM 9.3 08/07/2024    No results found for: \"CAION\"   Lab Results   Component Value Date    GLUCOSE 90 08/07/2024    CALCIUM 9.3 08/07/2024     08/07/2024    K 4.4 08/07/2024    CO2 29 08/07/2024     08/07/2024    BUN 17 08/07/2024    CREATININE 0.64 08/07/2024        Chemistry    Lab Results   Component Value Date/Time     08/07/2024 0959    K 4.4 08/07/2024 0959     08/07/2024 0959    CO2 29 08/07/2024 0959    BUN 17 08/07/2024 0959    CREATININE 0.64 08/07/2024 0959    Lab Results   Component Value Date/Time    CALCIUM 9.3 08/07/2024 0959    ALKPHOS 34 08/07/2024 0959    AST 16 08/07/2024 0959    ALT 14 08/07/2024 0959    BILITOT 0.5 08/07/2024 0959           Calcium >8.6? Yes   (CA must be >8.6mg/dl or ionized calcium WNL.  Hold / Contact provider if <8.6) (must be within 28 days of scheduled injection)    Lab Results   Component Value Date    CREATININE 0.64 08/07/2024        Crcl: 79.668   (Patients with a crcl <30 are at increased risk of hypocalcemia)      *Not a hard stop. If crcl < 30 pt to discuss supplementation with prescribing provider.    Calcium and Vitamin D supplement on medication list? VITAMIN D = YES. CALCIUM = NO    Current Outpatient Medications:     amLODIPine (Norvasc) 5 mg tablet, Take 1 tablet (5 mg) by mouth once daily., Disp: 90 tablet, Rfl: 3    aspirin 81 mg chewable tablet, Chew 1 tablet (81 mg) once daily., Disp: , Rfl:     cholecalciferol (Vitamin D3) 25 MCG (1000 UT) tablet, Take 1 tablet (25 mcg) by mouth once daily., Disp: , Rfl:     colestipol (Colestid) 1 gram tablet, Take 1 tablet (1 g) by mouth once daily., Disp: , Rfl:     dicyclomine (Bentyl) 10 mg capsule, Take 2 capsules (20 " mg) by mouth 3 times a day. prn, Disp: , Rfl:     ferrous sulfate, 325 mg ferrous sulfate, tablet, Take 1 tablet by mouth once daily with breakfast., Disp: , Rfl:     folic acid (Folvite) 400 mcg tablet, Take 1 tablet (0.4 mg) by mouth once daily., Disp: 30 tablet, Rfl: 2    glucos sul 2KCl/msm/chond/C/Mn (GLUCOSAMINE CHONDROITIN ORAL), Take 1 tablet by mouth once daily. Glucosamine Chondr 1500 Complx, Disp: , Rfl:     hydrocortisone-iodoquinoL (Dermazene) 1-1 % cream in packet, Dermazene 1-1 % External Cream APPLY EVERY DAY AS DIRECTED Quantity: 28.4 Refills: 0 Berta FERRARA, Jose Angel Start : 31-Mar-2017 Active, Disp: , Rfl:     lansoprazole (Prevacid) 30 mg DR capsule, Take 1 capsule (30 mg) by mouth once daily. Do not crush or chew., Disp: 90 capsule, Rfl: 1    leflunomide (Arava) 20 mg tablet, Take 1 tablet (20 mg) by mouth once daily., Disp: , Rfl:     lisinopril 40 mg tablet, Take 1 tablet (40 mg) by mouth once daily., Disp: 90 tablet, Rfl: 3    meloxicam (Mobic) 15 mg tablet, Take 1 tablet (15 mg) by mouth once daily., Disp: , Rfl:     methotrexate (Trexall) 2.5 mg tablet, Take 6 tablets (15 mg total) by mouth 1 (one) time per week., Disp: , Rfl:     metoprolol tartrate (Lopressor) 50 mg tablet, Take 1 tablet by mouth 2 times daily (morning and late afternoon)., Disp: 180 tablet, Rfl: 3    oxybutynin XL (Ditropan-XL) 15 mg 24 hr tablet, Take 1 tablet (15 mg) by mouth once daily., Disp: , Rfl:    (if no nurse to encourage discussion of supplementation at visit)    No obvious recent dental work per chart review. Nurse to confirm no dental within past/next 4 weeks at encounter.    Urine Hcg test ordered prior to first injection?Not applicable (If female pt <60 years of age and with reproductive capability)    Last injection received: Feb 2024 (if continuation)    Due: Oct 2024     Ok to schedule for prolia within 28 days of the calcium lab draw date listed above. OR… Ok to schedule for prolia; please instruct pt to have  labs drawn no more than 28 days prior to their scheduled appointment

## 2024-09-19 ENCOUNTER — OFFICE VISIT (OUTPATIENT)
Dept: PAIN MEDICINE | Facility: CLINIC | Age: 74
End: 2024-09-19
Payer: MEDICARE

## 2024-09-19 VITALS
DIASTOLIC BLOOD PRESSURE: 82 MMHG | RESPIRATION RATE: 18 BRPM | WEIGHT: 142 LBS | BODY MASS INDEX: 23.66 KG/M2 | HEIGHT: 65 IN | HEART RATE: 59 BPM | OXYGEN SATURATION: 96 % | SYSTOLIC BLOOD PRESSURE: 136 MMHG

## 2024-09-19 DIAGNOSIS — M47.816 LUMBAR FACET ARTHROPATHY: ICD-10-CM

## 2024-09-19 DIAGNOSIS — M51.36 DDD (DEGENERATIVE DISC DISEASE), LUMBAR: ICD-10-CM

## 2024-09-19 DIAGNOSIS — M43.10 PARS DEFECT WITH SPONDYLOLISTHESIS: Primary | ICD-10-CM

## 2024-09-19 PROCEDURE — 3079F DIAST BP 80-89 MM HG: CPT | Performed by: STUDENT IN AN ORGANIZED HEALTH CARE EDUCATION/TRAINING PROGRAM

## 2024-09-19 PROCEDURE — 1160F RVW MEDS BY RX/DR IN RCRD: CPT | Performed by: STUDENT IN AN ORGANIZED HEALTH CARE EDUCATION/TRAINING PROGRAM

## 2024-09-19 PROCEDURE — 1036F TOBACCO NON-USER: CPT | Performed by: STUDENT IN AN ORGANIZED HEALTH CARE EDUCATION/TRAINING PROGRAM

## 2024-09-19 PROCEDURE — 3008F BODY MASS INDEX DOCD: CPT | Performed by: STUDENT IN AN ORGANIZED HEALTH CARE EDUCATION/TRAINING PROGRAM

## 2024-09-19 PROCEDURE — 1159F MED LIST DOCD IN RCRD: CPT | Performed by: STUDENT IN AN ORGANIZED HEALTH CARE EDUCATION/TRAINING PROGRAM

## 2024-09-19 PROCEDURE — 99214 OFFICE O/P EST MOD 30 MIN: CPT | Performed by: STUDENT IN AN ORGANIZED HEALTH CARE EDUCATION/TRAINING PROGRAM

## 2024-09-19 PROCEDURE — 1125F AMNT PAIN NOTED PAIN PRSNT: CPT | Performed by: STUDENT IN AN ORGANIZED HEALTH CARE EDUCATION/TRAINING PROGRAM

## 2024-09-19 PROCEDURE — 3075F SYST BP GE 130 - 139MM HG: CPT | Performed by: STUDENT IN AN ORGANIZED HEALTH CARE EDUCATION/TRAINING PROGRAM

## 2024-09-19 ASSESSMENT — PAIN - FUNCTIONAL ASSESSMENT: PAIN_FUNCTIONAL_ASSESSMENT: 0-10

## 2024-09-19 ASSESSMENT — PAIN DESCRIPTION - DESCRIPTORS: DESCRIPTORS: ACHING

## 2024-09-19 ASSESSMENT — PAIN SCALES - GENERAL
PAINLEVEL: 4
PAINLEVEL_OUTOF10: 4

## 2024-09-26 ENCOUNTER — APPOINTMENT (OUTPATIENT)
Dept: OBSTETRICS AND GYNECOLOGY | Facility: CLINIC | Age: 74
End: 2024-09-26
Payer: MEDICARE

## 2024-09-26 VITALS — DIASTOLIC BLOOD PRESSURE: 78 MMHG | SYSTOLIC BLOOD PRESSURE: 120 MMHG | BODY MASS INDEX: 24.63 KG/M2 | WEIGHT: 148 LBS

## 2024-09-26 DIAGNOSIS — Z01.419 VISIT FOR GYNECOLOGIC EXAMINATION: ICD-10-CM

## 2024-09-26 DIAGNOSIS — Z12.31 SCREENING MAMMOGRAM FOR BREAST CANCER: Primary | ICD-10-CM

## 2024-09-26 PROCEDURE — G0101 CA SCREEN;PELVIC/BREAST EXAM: HCPCS | Performed by: MIDWIFE

## 2024-09-26 PROCEDURE — 3078F DIAST BP <80 MM HG: CPT | Performed by: MIDWIFE

## 2024-09-26 PROCEDURE — 1159F MED LIST DOCD IN RCRD: CPT | Performed by: MIDWIFE

## 2024-09-26 PROCEDURE — 1036F TOBACCO NON-USER: CPT | Performed by: MIDWIFE

## 2024-09-26 PROCEDURE — 3074F SYST BP LT 130 MM HG: CPT | Performed by: MIDWIFE

## 2024-09-26 NOTE — PROGRESS NOTES
Subjective   Patient ID: Amanda Hopkins is a 73 y.o. female who presents for Annual Exam.  HPI  PMHx: ; HTN, cardiac ablation, TAHl pt followed for degenerative disc d/o/back pain  SocHx: ; no loner SA  ROS: no pelvic pain, no urinary c/o, NAD  Review of Systems    Objective   Physical Exam  Vitals and nursing note reviewed.   Constitutional:       Appearance: Normal appearance.   HENT:      Nose: Nose normal.   Eyes:      Pupils: Pupils are equal, round, and reactive to light.   Neck:      Thyroid: No thyroid mass.   Cardiovascular:      Rate and Rhythm: Normal rate and regular rhythm.   Pulmonary:      Effort: Pulmonary effort is normal.      Breath sounds: Normal breath sounds.   Chest:      Chest wall: No mass.   Breasts:     Right: Normal.      Left: Normal.   Abdominal:      Palpations: Abdomen is soft. There is no mass.      Tenderness: There is no abdominal tenderness.   Genitourinary:     Labia:         Right: No rash, tenderness or lesion.         Left: No rash, tenderness or lesion.       Urethra: Urethral pain and urethral lesion present. No prolapse or urethral swelling.      Vagina: Normal.      Uterus: Absent.       Rectum: Normal.      Comments: Vulvovaginal atrophy  Cx absent  Bladder wnl  Musculoskeletal:         General: Normal range of motion.   Lymphadenopathy:      Cervical: No cervical adenopathy.      Lower Body: No right inguinal adenopathy. No left inguinal adenopathy.   Skin:     General: Skin is warm and dry.   Neurological:      Mental Status: She is alert and oriented to person, place, and time.      Motor: Motor function is intact.      Gait: Gait is intact.   Psychiatric:         Mood and Affect: Mood normal.         Assessment/Plan   Diagnoses and all orders for this visit:  Screening mammogram for breast cancer  -     BI mammo bilateral screening tomosynthesis; Future  Visit for gynecologic examination  -     Referral to Pain Medicine  -     BI mammo bilateral screening  tomosynthesis; Future       Reassurance given re exam  RTO AE/PRN    ELBA Santiago-EBRNY, ND 09/26/24 10:06 AM

## 2024-09-30 ENCOUNTER — APPOINTMENT (OUTPATIENT)
Dept: PRIMARY CARE | Facility: CLINIC | Age: 74
End: 2024-09-30
Payer: MEDICARE

## 2024-10-03 ENCOUNTER — HOSPITAL ENCOUNTER (OUTPATIENT)
Dept: RADIOLOGY | Facility: CLINIC | Age: 74
Discharge: HOME | End: 2024-10-03
Payer: MEDICARE

## 2024-10-03 VITALS — BODY MASS INDEX: 23.32 KG/M2 | WEIGHT: 140 LBS | HEIGHT: 65 IN

## 2024-10-03 DIAGNOSIS — Z01.419 VISIT FOR GYNECOLOGIC EXAMINATION: ICD-10-CM

## 2024-10-03 DIAGNOSIS — Z12.31 SCREENING MAMMOGRAM FOR BREAST CANCER: ICD-10-CM

## 2024-10-03 PROCEDURE — 77067 SCR MAMMO BI INCL CAD: CPT

## 2024-10-22 DIAGNOSIS — M81.8 OTHER OSTEOPOROSIS WITHOUT CURRENT PATHOLOGICAL FRACTURE: Primary | ICD-10-CM

## 2024-10-25 PROBLEM — L23.7 CONTACT DERMATITIS DUE TO POISON IVY: Status: RESOLVED | Noted: 2024-03-26 | Resolved: 2024-10-25

## 2024-10-25 PROBLEM — J02.9 SORE THROAT: Status: RESOLVED | Noted: 2024-03-26 | Resolved: 2024-10-25

## 2024-10-25 PROBLEM — R05.9 COUGH: Status: RESOLVED | Noted: 2024-03-26 | Resolved: 2024-10-25

## 2024-10-28 ENCOUNTER — APPOINTMENT (OUTPATIENT)
Dept: INFUSION THERAPY | Facility: CLINIC | Age: 74
End: 2024-10-28
Payer: MEDICARE

## 2024-10-28 ENCOUNTER — OFFICE VISIT (OUTPATIENT)
Dept: PRIMARY CARE | Facility: CLINIC | Age: 74
End: 2024-10-28
Payer: MEDICARE

## 2024-10-28 VITALS
SYSTOLIC BLOOD PRESSURE: 120 MMHG | BODY MASS INDEX: 24.49 KG/M2 | HEIGHT: 65 IN | TEMPERATURE: 96.8 F | WEIGHT: 147 LBS | HEART RATE: 66 BPM | OXYGEN SATURATION: 98 % | DIASTOLIC BLOOD PRESSURE: 88 MMHG

## 2024-10-28 DIAGNOSIS — I10 PRIMARY HYPERTENSION: Primary | ICD-10-CM

## 2024-10-28 DIAGNOSIS — Z00.00 ANNUAL PHYSICAL EXAM: ICD-10-CM

## 2024-10-28 DIAGNOSIS — H61.23 IMPACTED CERUMEN OF BOTH EARS: ICD-10-CM

## 2024-10-28 DIAGNOSIS — I47.10 SVT (SUPRAVENTRICULAR TACHYCARDIA) (CMS-HCC): ICD-10-CM

## 2024-10-28 DIAGNOSIS — T78.40XA ALLERGY, INITIAL ENCOUNTER: ICD-10-CM

## 2024-10-28 DIAGNOSIS — Z00.00 ROUTINE GENERAL MEDICAL EXAMINATION AT HEALTH CARE FACILITY: ICD-10-CM

## 2024-10-28 DIAGNOSIS — M05.9 RHEUMATOID ARTHRITIS WITH POSITIVE RHEUMATOID FACTOR, INVOLVING UNSPECIFIED SITE (MULTI): ICD-10-CM

## 2024-10-28 DIAGNOSIS — Z11.59 ENCOUNTER FOR HEPATITIS C SCREENING TEST FOR LOW RISK PATIENT: ICD-10-CM

## 2024-10-28 DIAGNOSIS — M81.8 OTHER OSTEOPOROSIS WITHOUT CURRENT PATHOLOGICAL FRACTURE: ICD-10-CM

## 2024-10-28 DIAGNOSIS — E78.00 PURE HYPERCHOLESTEROLEMIA: ICD-10-CM

## 2024-10-28 DIAGNOSIS — K21.9 GASTROESOPHAGEAL REFLUX DISEASE WITHOUT ESOPHAGITIS: ICD-10-CM

## 2024-10-28 PROCEDURE — 99215 OFFICE O/P EST HI 40 MIN: CPT | Performed by: NURSE PRACTITIONER

## 2024-10-28 PROCEDURE — 69209 REMOVE IMPACTED EAR WAX UNI: CPT | Mod: 50 | Performed by: NURSE PRACTITIONER

## 2024-10-28 PROCEDURE — 90662 IIV NO PRSV INCREASED AG IM: CPT | Performed by: NURSE PRACTITIONER

## 2024-10-28 RX ORDER — FLUTICASONE PROPIONATE 50 MCG
1 SPRAY, SUSPENSION (ML) NASAL DAILY
Qty: 16 G | Refills: 5 | Status: SHIPPED | OUTPATIENT
Start: 2024-10-28 | End: 2025-10-28

## 2024-10-28 ASSESSMENT — PATIENT HEALTH QUESTIONNAIRE - PHQ9
SUM OF ALL RESPONSES TO PHQ9 QUESTIONS 1 AND 2: 0
2. FEELING DOWN, DEPRESSED OR HOPELESS: NOT AT ALL
1. LITTLE INTEREST OR PLEASURE IN DOING THINGS: NOT AT ALL

## 2024-10-28 ASSESSMENT — PAIN SCALES - GENERAL: PAINLEVEL_OUTOF10: 0-NO PAIN

## 2024-10-31 ENCOUNTER — CLINICAL SUPPORT (OUTPATIENT)
Dept: PRIMARY CARE | Facility: CLINIC | Age: 74
End: 2024-10-31
Payer: MEDICARE

## 2024-10-31 ENCOUNTER — APPOINTMENT (OUTPATIENT)
Dept: PRIMARY CARE | Facility: CLINIC | Age: 74
End: 2024-10-31
Payer: MEDICARE

## 2024-10-31 DIAGNOSIS — H61.21 IMPACTED CERUMEN OF RIGHT EAR: ICD-10-CM

## 2024-10-31 PROCEDURE — 69209 REMOVE IMPACTED EAR WAX UNI: CPT | Performed by: INTERNAL MEDICINE

## 2024-11-01 ENCOUNTER — OFFICE VISIT (OUTPATIENT)
Dept: URGENT CARE | Age: 74
End: 2024-11-01
Payer: MEDICARE

## 2024-11-01 VITALS
WEIGHT: 140 LBS | TEMPERATURE: 98.2 F | DIASTOLIC BLOOD PRESSURE: 88 MMHG | SYSTOLIC BLOOD PRESSURE: 162 MMHG | OXYGEN SATURATION: 98 % | HEART RATE: 69 BPM | BODY MASS INDEX: 23.3 KG/M2 | RESPIRATION RATE: 18 BRPM

## 2024-11-01 DIAGNOSIS — L03.011 PARONYCHIA OF FINGER, RIGHT: Primary | ICD-10-CM

## 2024-11-01 PROCEDURE — 99203 OFFICE O/P NEW LOW 30 MIN: CPT | Performed by: PHYSICIAN ASSISTANT

## 2024-11-01 PROCEDURE — 1160F RVW MEDS BY RX/DR IN RCRD: CPT | Performed by: PHYSICIAN ASSISTANT

## 2024-11-01 PROCEDURE — 3079F DIAST BP 80-89 MM HG: CPT | Performed by: PHYSICIAN ASSISTANT

## 2024-11-01 PROCEDURE — 1159F MED LIST DOCD IN RCRD: CPT | Performed by: PHYSICIAN ASSISTANT

## 2024-11-01 PROCEDURE — 3077F SYST BP >= 140 MM HG: CPT | Performed by: PHYSICIAN ASSISTANT

## 2024-11-01 RX ORDER — DOXYCYCLINE 100 MG/1
100 CAPSULE ORAL 2 TIMES DAILY
Qty: 20 CAPSULE | Refills: 0 | Status: SHIPPED | OUTPATIENT
Start: 2024-11-01 | End: 2024-11-11

## 2024-11-01 NOTE — PROGRESS NOTES
"Subjective   Patient ID: Amanda Hopkins \"Emani\" is a 74 y.o. female. They present today with a chief complaint of Other (RIGHT FINGER INFECTION  SINCE YESTERDAY ).    History of Present Illness  Right finger infection started yesterday.              Past Medical History  Allergies as of 11/01/2024 - Reviewed 11/01/2024   Allergen Reaction Noted    Morphine Unknown 09/16/2023    Propoxyphene n-acetaminophen Unknown 09/16/2023    Propoxyphene Nausea And Vomiting and Unknown 03/27/2024       (Not in a hospital admission)       Past Medical History:   Diagnosis Date    Endometriosis     Other specified diseases of intestine 01/27/2017    Colonic mass    Paroxysmal tachycardia 09/16/2023    Personal history of other diseases of the musculoskeletal system and connective tissue     History of osteoarthritis    Supraventricular tachycardia (CMS-HCC)     Supraventricular tachycardia by ECG       Past Surgical History:   Procedure Laterality Date    EYE SURGERY      OTHER SURGICAL HISTORY  01/27/2017    Laryngeal Surgery    OTHER SURGICAL HISTORY  03/11/2020    Colectomy    OTHER SURGICAL HISTORY  04/10/2017    Laparoscopy Partial Colectomy    TONSILLECTOMY  01/27/2017    Tonsillectomy    TOTAL ABDOMINAL HYSTERECTOMY W/ BILATERAL SALPINGOOPHORECTOMY  01/27/2017    Total Abdominal Hysterectomy With Removal Of Both Ovaries        reports that she has never smoked. She has never been exposed to tobacco smoke. She has never used smokeless tobacco. She reports current alcohol use. She reports that she does not use drugs.    Review of Systems  Review of Systems   Constitutional:  Negative for chills, diaphoresis and fever.   Respiratory:  Negative for shortness of breath.    Cardiovascular:  Negative for chest pain.   Skin:  Positive for color change.                                  Objective    Vitals:    11/01/24 1644   BP: 162/88   Pulse: 69   Resp: 18   Temp: 36.8 °C (98.2 °F)   TempSrc: Oral   SpO2: 98%   Weight: 63.5 kg (140 " lb)     No LMP recorded. Patient has had a hysterectomy.    Physical Exam  Constitutional:       General: She is not in acute distress.  Cardiovascular:      Rate and Rhythm: Normal rate.   Pulmonary:      Effort: Pulmonary effort is normal.   Skin:     Findings: Erythema present.      Comments: Right finger #2: strength 5/5, sensation intact, full ROM. Mild erythema/swelling. No ecchymosis. Paronychia lateral to nail.    Neurological:      Mental Status: She is alert.         Incision and Drainage    Date/Time: 11/13/2024 3:52 PM    Performed by: Mona Inman PA-C  Authorized by: Mona Inman PA-C    Consent:     Consent obtained:  Verbal    Consent given by:  Patient    Risks, benefits, and alternatives were discussed: yes      Risks discussed:  Bleeding, incomplete drainage, infection and pain    Alternatives discussed:  No treatment and referral  Universal protocol:     Procedure explained and questions answered to patient or proxy's satisfaction: yes      Immediately prior to procedure, a time out was called: yes      Patient identity confirmed:  Verbally with patient  Location:     Type:  Abscess    Location:  Upper extremity    Upper extremity location:  Finger    Finger location:  R index finger  Pre-procedure details:     Procedure prep: alcohol wipses.  Anesthesia:     Anesthesia method:  None  Procedure type:     Complexity:  Simple  Procedure details:     Incision types:  Stab incision    Drainage:  Purulent    Drainage amount:  Scant  Post-procedure details:     Procedure completion:  Tolerated well, no immediate complications  Comments:      Used 18 gauge needle, created opening for drainage, drained all purulent material.       Point of Care Test & Imaging Results from this visit  No results found for this visit on 11/01/24.   No results found.    Diagnostic study results (if any) were reviewed by Mona Inman PA-C.    Assessment/Plan   Allergies, medications, history, and pertinent labs/EKGs/Imaging  reviewed by Mona Inman PA-C.         Orders and Diagnoses  There are no diagnoses linked to this encounter.    Medical Admin Record      Patient disposition: Home    Electronically signed by Mona Inman PA-C  6:19 PM

## 2024-11-04 DIAGNOSIS — K21.9 GASTROESOPHAGEAL REFLUX DISEASE WITHOUT ESOPHAGITIS: ICD-10-CM

## 2024-11-07 RX ORDER — LANSOPRAZOLE 30 MG/1
30 CAPSULE, DELAYED RELEASE ORAL DAILY
Qty: 90 CAPSULE | Refills: 2 | Status: SHIPPED | OUTPATIENT
Start: 2024-11-07

## 2024-11-08 ENCOUNTER — LAB (OUTPATIENT)
Dept: LAB | Facility: LAB | Age: 74
End: 2024-11-08
Payer: MEDICARE

## 2024-11-08 ENCOUNTER — HOSPITAL ENCOUNTER (OUTPATIENT)
Dept: RADIOLOGY | Facility: CLINIC | Age: 74
Discharge: HOME | End: 2024-11-08
Payer: MEDICARE

## 2024-11-08 ENCOUNTER — OFFICE VISIT (OUTPATIENT)
Dept: RHEUMATOLOGY | Facility: CLINIC | Age: 74
End: 2024-11-08
Payer: MEDICARE

## 2024-11-08 VITALS — WEIGHT: 145.9 LBS | DIASTOLIC BLOOD PRESSURE: 66 MMHG | BODY MASS INDEX: 24.28 KG/M2 | SYSTOLIC BLOOD PRESSURE: 118 MMHG

## 2024-11-08 DIAGNOSIS — M06.9 RHEUMATOID ARTHRITIS, INVOLVING UNSPECIFIED SITE, UNSPECIFIED WHETHER RHEUMATOID FACTOR PRESENT (MULTI): ICD-10-CM

## 2024-11-08 DIAGNOSIS — M19.90 OSTEOARTHRITIS, UNSPECIFIED OSTEOARTHRITIS TYPE, UNSPECIFIED SITE: ICD-10-CM

## 2024-11-08 DIAGNOSIS — M06.9 RHEUMATOID ARTHRITIS, INVOLVING UNSPECIFIED SITE, UNSPECIFIED WHETHER RHEUMATOID FACTOR PRESENT (MULTI): Primary | ICD-10-CM

## 2024-11-08 DIAGNOSIS — M81.8 OTHER OSTEOPOROSIS WITHOUT CURRENT PATHOLOGICAL FRACTURE: ICD-10-CM

## 2024-11-08 LAB
ALBUMIN SERPL BCP-MCNC: 4.6 G/DL (ref 3.4–5)
ALP SERPL-CCNC: 42 U/L (ref 33–136)
ALT SERPL W P-5'-P-CCNC: 16 U/L (ref 7–45)
AST SERPL W P-5'-P-CCNC: 18 U/L (ref 9–39)
BASOPHILS # BLD AUTO: 0.09 X10*3/UL (ref 0–0.1)
BASOPHILS NFR BLD AUTO: 1.6 %
BILIRUB DIRECT SERPL-MCNC: 0.1 MG/DL (ref 0–0.3)
BILIRUB SERPL-MCNC: 0.5 MG/DL (ref 0–1.2)
CREAT SERPL-MCNC: 0.66 MG/DL (ref 0.5–1.05)
CRP SERPL-MCNC: 0.14 MG/DL
EGFRCR SERPLBLD CKD-EPI 2021: >90 ML/MIN/1.73M*2
EOSINOPHIL # BLD AUTO: 0.27 X10*3/UL (ref 0–0.4)
EOSINOPHIL NFR BLD AUTO: 4.8 %
ERYTHROCYTE [DISTWIDTH] IN BLOOD BY AUTOMATED COUNT: 13.2 % (ref 11.5–14.5)
HCT VFR BLD AUTO: 35.5 % (ref 36–46)
HGB BLD-MCNC: 11.2 G/DL (ref 12–16)
IMM GRANULOCYTES # BLD AUTO: 0.06 X10*3/UL (ref 0–0.5)
IMM GRANULOCYTES NFR BLD AUTO: 1.1 % (ref 0–0.9)
LYMPHOCYTES # BLD AUTO: 1.94 X10*3/UL (ref 0.8–3)
LYMPHOCYTES NFR BLD AUTO: 34.4 %
MCH RBC QN AUTO: 30.9 PG (ref 26–34)
MCHC RBC AUTO-ENTMCNC: 31.5 G/DL (ref 32–36)
MCV RBC AUTO: 98 FL (ref 80–100)
MONOCYTES # BLD AUTO: 0.7 X10*3/UL (ref 0.05–0.8)
MONOCYTES NFR BLD AUTO: 12.4 %
NEUTROPHILS # BLD AUTO: 2.58 X10*3/UL (ref 1.6–5.5)
NEUTROPHILS NFR BLD AUTO: 45.7 %
NRBC BLD-RTO: 0 /100 WBCS (ref 0–0)
PLATELET # BLD AUTO: 295 X10*3/UL (ref 150–450)
PROT SERPL-MCNC: 6.9 G/DL (ref 6.4–8.2)
RBC # BLD AUTO: 3.63 X10*6/UL (ref 4–5.2)
WBC # BLD AUTO: 5.6 X10*3/UL (ref 4.4–11.3)

## 2024-11-08 PROCEDURE — 1159F MED LIST DOCD IN RCRD: CPT | Performed by: INTERNAL MEDICINE

## 2024-11-08 PROCEDURE — 99214 OFFICE O/P EST MOD 30 MIN: CPT | Performed by: INTERNAL MEDICINE

## 2024-11-08 PROCEDURE — 82565 ASSAY OF CREATININE: CPT

## 2024-11-08 PROCEDURE — 3078F DIAST BP <80 MM HG: CPT | Performed by: INTERNAL MEDICINE

## 2024-11-08 PROCEDURE — 73630 X-RAY EXAM OF FOOT: CPT | Mod: LT

## 2024-11-08 PROCEDURE — 36415 COLL VENOUS BLD VENIPUNCTURE: CPT

## 2024-11-08 PROCEDURE — 86140 C-REACTIVE PROTEIN: CPT

## 2024-11-08 PROCEDURE — 85025 COMPLETE CBC W/AUTO DIFF WBC: CPT

## 2024-11-08 PROCEDURE — 80076 HEPATIC FUNCTION PANEL: CPT

## 2024-11-08 PROCEDURE — 3074F SYST BP LT 130 MM HG: CPT | Performed by: INTERNAL MEDICINE

## 2024-11-08 RX ORDER — PILOCARPINE HYDROCHLORIDE 5 MG/1
5 TABLET, FILM COATED ORAL 3 TIMES DAILY
Qty: 90 TABLET | Refills: 3 | Status: SHIPPED | OUTPATIENT
Start: 2024-11-08 | End: 2025-11-08

## 2024-11-08 NOTE — PROGRESS NOTES
"Recheck  OA  /  RA  OP  ( Prolia - Aug )  doing well   Current use of antibiotic for finger infection.    L foot injury with tripping x 3 days ago.       HPI - she cought her toe and tripped 3 d ago, and she has bruising.  She is using an ortho shoe, which helps.   She had an infection in her R index finger - went to ER who lanced it and put her on abx.   Usual hand pain.  No swelling.  She saw pain mgmt and was dx with pars defect.  She hasn't been doing the back exercise.  +dry mouth.  She is using OTCs    PE  NAD  RRR no r/m/g  CTA  No edema  No synovitis  Tender L 2nd metatarsal and bruisng centering there    A/P - OA and \"seropositive\" RA per prev rheum but neg RF and CCP Ab and deformities more c/w inflam OA - stable  Xerostomia - ?if d/t meds.  Pilocarpine - expl risks/benefits  Check foot x-ray  See GI about dysphagia  OP-on prolia - last DEXA 8/23  Reviewed prev labs  Check labs  Follow up 3 mo or sooner PRN  "

## 2024-11-13 PROCEDURE — 26010 DRAINAGE OF FINGER ABSCESS: CPT | Performed by: PHYSICIAN ASSISTANT

## 2024-11-13 ASSESSMENT — ENCOUNTER SYMPTOMS
SHORTNESS OF BREATH: 0
FEVER: 0
CHILLS: 0
DIAPHORESIS: 0
COLOR CHANGE: 1

## 2024-11-20 DIAGNOSIS — M06.9 RHEUMATOID ARTHRITIS, INVOLVING UNSPECIFIED SITE, UNSPECIFIED WHETHER RHEUMATOID FACTOR PRESENT (MULTI): Primary | ICD-10-CM

## 2024-11-20 RX ORDER — LEFLUNOMIDE 20 MG/1
20 TABLET ORAL DAILY
Qty: 90 TABLET | Refills: 0 | Status: SHIPPED | OUTPATIENT
Start: 2024-11-20

## 2024-11-20 RX ORDER — MELOXICAM 15 MG/1
15 TABLET ORAL DAILY
Qty: 90 TABLET | Refills: 0 | Status: SHIPPED | OUTPATIENT
Start: 2024-11-20

## 2024-11-23 ENCOUNTER — LAB (OUTPATIENT)
Dept: LAB | Facility: LAB | Age: 74
End: 2024-11-23
Payer: MEDICARE

## 2024-11-23 DIAGNOSIS — Z11.59 ENCOUNTER FOR HEPATITIS C SCREENING TEST FOR LOW RISK PATIENT: ICD-10-CM

## 2024-11-23 DIAGNOSIS — Z00.00 ANNUAL PHYSICAL EXAM: ICD-10-CM

## 2024-11-23 LAB
CHOLEST SERPL-MCNC: 243 MG/DL (ref 0–199)
CHOLEST/HDLC SERPL: 3.8 {RATIO}
EST. AVERAGE GLUCOSE BLD GHB EST-MCNC: 105 MG/DL
HBA1C MFR BLD: 5.3 %
HCV AB SER QL: NONREACTIVE
HDLC SERPL-MCNC: 64.7 MG/DL
LDLC SERPL CALC-MCNC: 162 MG/DL
NON HDL CHOLESTEROL: 178 MG/DL (ref 0–149)
TRIGL SERPL-MCNC: 81 MG/DL (ref 0–149)
TSH SERPL-ACNC: 1.17 MIU/L (ref 0.44–3.98)
VLDL: 16 MG/DL (ref 0–40)

## 2024-11-23 PROCEDURE — 36415 COLL VENOUS BLD VENIPUNCTURE: CPT

## 2025-01-14 ENCOUNTER — LAB (OUTPATIENT)
Dept: LAB | Facility: LAB | Age: 75
End: 2025-01-14
Payer: MEDICARE

## 2025-01-14 DIAGNOSIS — Z01.818 ENCOUNTER FOR OTHER PREPROCEDURAL EXAMINATION: Primary | ICD-10-CM

## 2025-01-14 LAB
ALBUMIN SERPL BCP-MCNC: 4.7 G/DL (ref 3.4–5)
ALP SERPL-CCNC: 47 U/L (ref 33–136)
ALT SERPL W P-5'-P-CCNC: 17 U/L (ref 7–45)
ANION GAP SERPL CALCULATED.3IONS-SCNC: 12 MMOL/L (ref 10–20)
AST SERPL W P-5'-P-CCNC: 21 U/L (ref 9–39)
BASOPHILS # BLD AUTO: 0.11 X10*3/UL (ref 0–0.1)
BASOPHILS NFR BLD AUTO: 1.5 %
BILIRUB SERPL-MCNC: 0.4 MG/DL (ref 0–1.2)
BUN SERPL-MCNC: 18 MG/DL (ref 6–23)
CALCIUM SERPL-MCNC: 9.7 MG/DL (ref 8.6–10.3)
CHLORIDE SERPL-SCNC: 103 MMOL/L (ref 98–107)
CO2 SERPL-SCNC: 29 MMOL/L (ref 21–32)
CREAT SERPL-MCNC: 0.63 MG/DL (ref 0.5–1.05)
EGFRCR SERPLBLD CKD-EPI 2021: >90 ML/MIN/1.73M*2
EOSINOPHIL # BLD AUTO: 0.27 X10*3/UL (ref 0–0.4)
EOSINOPHIL NFR BLD AUTO: 3.8 %
ERYTHROCYTE [DISTWIDTH] IN BLOOD BY AUTOMATED COUNT: 13.2 % (ref 11.5–14.5)
GLUCOSE SERPL-MCNC: 94 MG/DL (ref 74–99)
HCT VFR BLD AUTO: 37.9 % (ref 36–46)
HGB BLD-MCNC: 12.3 G/DL (ref 12–16)
IMM GRANULOCYTES # BLD AUTO: 0.02 X10*3/UL (ref 0–0.5)
IMM GRANULOCYTES NFR BLD AUTO: 0.3 % (ref 0–0.9)
LYMPHOCYTES # BLD AUTO: 2.63 X10*3/UL (ref 0.8–3)
LYMPHOCYTES NFR BLD AUTO: 36.7 %
MCH RBC QN AUTO: 31.1 PG (ref 26–34)
MCHC RBC AUTO-ENTMCNC: 32.5 G/DL (ref 32–36)
MCV RBC AUTO: 96 FL (ref 80–100)
MONOCYTES # BLD AUTO: 0.74 X10*3/UL (ref 0.05–0.8)
MONOCYTES NFR BLD AUTO: 10.3 %
NEUTROPHILS # BLD AUTO: 3.4 X10*3/UL (ref 1.6–5.5)
NEUTROPHILS NFR BLD AUTO: 47.4 %
NRBC BLD-RTO: 0 /100 WBCS (ref 0–0)
PLATELET # BLD AUTO: 274 X10*3/UL (ref 150–450)
POTASSIUM SERPL-SCNC: 3.9 MMOL/L (ref 3.5–5.3)
PROT SERPL-MCNC: 7.2 G/DL (ref 6.4–8.2)
RBC # BLD AUTO: 3.95 X10*6/UL (ref 4–5.2)
SODIUM SERPL-SCNC: 140 MMOL/L (ref 136–145)
WBC # BLD AUTO: 7.2 X10*3/UL (ref 4.4–11.3)

## 2025-01-14 PROCEDURE — 80053 COMPREHEN METABOLIC PANEL: CPT

## 2025-01-14 PROCEDURE — 85025 COMPLETE CBC W/AUTO DIFF WBC: CPT

## 2025-01-15 DIAGNOSIS — Z98.890 POSTOPERATIVE STATE: Primary | ICD-10-CM

## 2025-01-15 RX ORDER — TRAMADOL HYDROCHLORIDE 50 MG/1
50 TABLET ORAL EVERY 8 HOURS PRN
Qty: 21 TABLET | Refills: 0 | Status: SHIPPED | OUTPATIENT
Start: 2025-01-15

## 2025-01-15 RX ORDER — ONDANSETRON 4 MG/1
8 TABLET, FILM COATED ORAL EVERY 8 HOURS PRN
Qty: 15 TABLET | Refills: 0 | Status: SHIPPED | OUTPATIENT
Start: 2025-01-15 | End: 2025-01-20

## 2025-01-22 DIAGNOSIS — Z98.890 POSTOPERATIVE STATE: ICD-10-CM

## 2025-01-22 RX ORDER — TRAMADOL HYDROCHLORIDE 50 MG/1
50 TABLET ORAL EVERY 8 HOURS PRN
Qty: 21 TABLET | Refills: 0 | Status: SHIPPED | OUTPATIENT
Start: 2025-01-22

## 2025-02-10 ENCOUNTER — LAB REQUISITION (OUTPATIENT)
Dept: LAB | Facility: HOSPITAL | Age: 75
End: 2025-02-10
Payer: MEDICARE

## 2025-02-10 DIAGNOSIS — M46.20 OSTEOMYELITIS OF VERTEBRA, SITE UNSPECIFIED (MULTI): ICD-10-CM

## 2025-02-10 LAB
ALBUMIN SERPL BCP-MCNC: 4 G/DL (ref 3.4–5)
ALP SERPL-CCNC: 52 U/L (ref 33–136)
ALT SERPL W P-5'-P-CCNC: 11 U/L (ref 7–45)
ANION GAP SERPL CALCULATED.3IONS-SCNC: 9 MMOL/L (ref 10–20)
AST SERPL W P-5'-P-CCNC: 15 U/L (ref 9–39)
BASOPHILS # BLD AUTO: 0.08 X10*3/UL (ref 0–0.1)
BASOPHILS NFR BLD AUTO: 1.1 %
BILIRUB SERPL-MCNC: 0.4 MG/DL (ref 0–1.2)
BUN SERPL-MCNC: 21 MG/DL (ref 6–23)
CALCIUM SERPL-MCNC: 9.4 MG/DL (ref 8.6–10.3)
CHLORIDE SERPL-SCNC: 105 MMOL/L (ref 98–107)
CO2 SERPL-SCNC: 31 MMOL/L (ref 21–32)
CREAT SERPL-MCNC: 0.63 MG/DL (ref 0.5–1.05)
CRP SERPL-MCNC: 0.2 MG/DL
EGFRCR SERPLBLD CKD-EPI 2021: >90 ML/MIN/1.73M*2
EOSINOPHIL # BLD AUTO: 0.35 X10*3/UL (ref 0–0.4)
EOSINOPHIL NFR BLD AUTO: 5 %
ERYTHROCYTE [DISTWIDTH] IN BLOOD BY AUTOMATED COUNT: 13 % (ref 11.5–14.5)
ERYTHROCYTE [SEDIMENTATION RATE] IN BLOOD BY WESTERGREN METHOD: 8 MM/H (ref 0–30)
GLUCOSE SERPL-MCNC: 96 MG/DL (ref 74–99)
HCT VFR BLD AUTO: 35.5 % (ref 36–46)
HGB BLD-MCNC: 11.2 G/DL (ref 12–16)
HOLD SPECIMEN: NORMAL
HOLD SPECIMEN: NORMAL
IMM GRANULOCYTES # BLD AUTO: 0.02 X10*3/UL (ref 0–0.5)
IMM GRANULOCYTES NFR BLD AUTO: 0.3 % (ref 0–0.9)
LYMPHOCYTES # BLD AUTO: 1.91 X10*3/UL (ref 0.8–3)
LYMPHOCYTES NFR BLD AUTO: 27.1 %
MCH RBC QN AUTO: 30.2 PG (ref 26–34)
MCHC RBC AUTO-ENTMCNC: 31.5 G/DL (ref 32–36)
MCV RBC AUTO: 96 FL (ref 80–100)
MONOCYTES # BLD AUTO: 0.82 X10*3/UL (ref 0.05–0.8)
MONOCYTES NFR BLD AUTO: 11.6 %
NEUTROPHILS # BLD AUTO: 3.88 X10*3/UL (ref 1.6–5.5)
NEUTROPHILS NFR BLD AUTO: 54.9 %
NRBC BLD-RTO: 0 /100 WBCS (ref 0–0)
PLATELET # BLD AUTO: 270 X10*3/UL (ref 150–450)
POTASSIUM SERPL-SCNC: 4.7 MMOL/L (ref 3.5–5.3)
PROT SERPL-MCNC: 6.7 G/DL (ref 6.4–8.2)
RBC # BLD AUTO: 3.71 X10*6/UL (ref 4–5.2)
SODIUM SERPL-SCNC: 140 MMOL/L (ref 136–145)
WBC # BLD AUTO: 7.1 X10*3/UL (ref 4.4–11.3)

## 2025-02-10 PROCEDURE — 80053 COMPREHEN METABOLIC PANEL: CPT

## 2025-02-10 PROCEDURE — 85652 RBC SED RATE AUTOMATED: CPT

## 2025-02-10 PROCEDURE — 85025 COMPLETE CBC W/AUTO DIFF WBC: CPT

## 2025-02-10 PROCEDURE — 86140 C-REACTIVE PROTEIN: CPT

## 2025-02-12 ENCOUNTER — APPOINTMENT (OUTPATIENT)
Dept: PAIN MEDICINE | Facility: CLINIC | Age: 75
End: 2025-02-12
Payer: MEDICARE

## 2025-02-12 ENCOUNTER — TELEPHONE (OUTPATIENT)
Dept: PRIMARY CARE | Facility: CLINIC | Age: 75
End: 2025-02-12
Payer: MEDICARE

## 2025-02-12 NOTE — TELEPHONE ENCOUNTER
Jeffry pt.  Pt states Dr Garcia, her pain medicine doctor, ordered labs and some of the values are showing low (RBC, hemoglobin, hematocrit).  This MD advised pt to check with PCP to see what she needs to do to bring these values up.  Please review labs from 2/10/25 and advise next steps.  Ph:  [phone]

## 2025-02-12 NOTE — TELEPHONE ENCOUNTER
LMOM advising pt that Dr Teran out of office until 2/17/25 but that the covering provider indicates the lab values are all similar to previous draws.  However, will have Dr Teran review further and advise next steps if any next week.

## 2025-02-14 ENCOUNTER — OFFICE VISIT (OUTPATIENT)
Dept: RHEUMATOLOGY | Facility: CLINIC | Age: 75
End: 2025-02-14
Payer: MEDICARE

## 2025-02-14 VITALS — SYSTOLIC BLOOD PRESSURE: 118 MMHG | DIASTOLIC BLOOD PRESSURE: 64 MMHG

## 2025-02-14 DIAGNOSIS — M06.9 RHEUMATOID ARTHRITIS, INVOLVING UNSPECIFIED SITE, UNSPECIFIED WHETHER RHEUMATOID FACTOR PRESENT (MULTI): Primary | ICD-10-CM

## 2025-02-14 DIAGNOSIS — M81.8 OTHER OSTEOPOROSIS WITHOUT CURRENT PATHOLOGICAL FRACTURE: ICD-10-CM

## 2025-02-14 DIAGNOSIS — M19.90 OSTEOARTHRITIS, UNSPECIFIED OSTEOARTHRITIS TYPE, UNSPECIFIED SITE: ICD-10-CM

## 2025-02-14 PROCEDURE — 1159F MED LIST DOCD IN RCRD: CPT | Performed by: INTERNAL MEDICINE

## 2025-02-14 PROCEDURE — 3078F DIAST BP <80 MM HG: CPT | Performed by: INTERNAL MEDICINE

## 2025-02-14 PROCEDURE — 3074F SYST BP LT 130 MM HG: CPT | Performed by: INTERNAL MEDICINE

## 2025-02-14 PROCEDURE — 99214 OFFICE O/P EST MOD 30 MIN: CPT | Performed by: INTERNAL MEDICINE

## 2025-02-14 NOTE — PROGRESS NOTES
"Recheck  OA  /  RA  /  OP  ( Prolia ) MRI spine Orhto with poss infection from surgical infection in foot.    Diarrhea x 4 yesterday and not feeling well.  Better today.   \" Maybe a touch of a bud ?\"    HPI - No current joint pain/swelling.  She said that she ias an infection in her toe - \"it doesn't want to stay closed\" after surg.  She is to start abx - she has already been on abx x3.  She said that her back dr told her last wk that she might have an infection in her spine and she is to have a CT scan next week.  Wed this wk she fels very bad and then had \"violent\" diarrhea yesterday 4-5 times.  No nausea.  No fever, CP, or SOB    PE  NAD  RRR no r/m/g  CTA  No edema  No synovitis    A/P - OA and \"seropositive\" RA per prev rheum but with neg RF and CCP Ab  Reviewed recent labs from back  - nl ESR, CRP, and WBCs  No need to check labs today  OP - on prolia and scheduled in April after her dental work  Follow up 3 mo    "

## 2025-02-16 DIAGNOSIS — D64.9 NORMOCHROMIC NORMOCYTIC ANEMIA: Primary | ICD-10-CM

## 2025-02-18 NOTE — TELEPHONE ENCOUNTER
Advised pt that she has had anemia since 2020 and hemoglobin/hematocrit have always been around the same level, to the anemia is related to her chronic rheumatoid arthritis.  Pt advised to go to lab for iron and B12.

## 2025-02-19 ENCOUNTER — HOSPITAL ENCOUNTER (OUTPATIENT)
Dept: RADIOLOGY | Facility: CLINIC | Age: 75
Discharge: HOME | End: 2025-02-19
Payer: MEDICARE

## 2025-02-19 ENCOUNTER — HOSPITAL ENCOUNTER (OUTPATIENT)
Dept: RADIOLOGY | Facility: CLINIC | Age: 75
End: 2025-02-19
Payer: MEDICARE

## 2025-02-19 DIAGNOSIS — M46.20 OSTEOMYELITIS OF VERTEBRA, SITE UNSPECIFIED (MULTI): ICD-10-CM

## 2025-02-19 PROCEDURE — 72132 CT LUMBAR SPINE W/DYE: CPT

## 2025-02-19 PROCEDURE — 72131 CT LUMBAR SPINE W/O DYE: CPT

## 2025-02-19 PROCEDURE — 2550000001 HC RX 255 CONTRASTS

## 2025-02-19 RX ADMIN — IOHEXOL 75 ML: 350 INJECTION, SOLUTION INTRAVENOUS at 11:00

## 2025-03-06 DIAGNOSIS — M06.9 RHEUMATOID ARTHRITIS, INVOLVING UNSPECIFIED SITE, UNSPECIFIED WHETHER RHEUMATOID FACTOR PRESENT (MULTI): ICD-10-CM

## 2025-03-07 RX ORDER — LEFLUNOMIDE 20 MG/1
20 TABLET ORAL DAILY
Qty: 90 TABLET | Refills: 0 | Status: SHIPPED | OUTPATIENT
Start: 2025-03-07

## 2025-03-26 ENCOUNTER — OFFICE VISIT (OUTPATIENT)
Dept: CARDIOLOGY | Facility: CLINIC | Age: 75
End: 2025-03-26
Payer: MEDICARE

## 2025-03-26 VITALS
RESPIRATION RATE: 14 BRPM | HEIGHT: 65 IN | BODY MASS INDEX: 24.16 KG/M2 | OXYGEN SATURATION: 95 % | HEART RATE: 86 BPM | DIASTOLIC BLOOD PRESSURE: 80 MMHG | WEIGHT: 145 LBS | SYSTOLIC BLOOD PRESSURE: 129 MMHG

## 2025-03-26 DIAGNOSIS — I10 PRIMARY HYPERTENSION: Primary | ICD-10-CM

## 2025-03-26 DIAGNOSIS — E78.2 MIXED HYPERLIPIDEMIA: ICD-10-CM

## 2025-03-26 DIAGNOSIS — I47.10: ICD-10-CM

## 2025-03-26 DIAGNOSIS — E03.9 ACQUIRED HYPOTHYROIDISM: ICD-10-CM

## 2025-03-26 PROCEDURE — 1159F MED LIST DOCD IN RCRD: CPT | Performed by: INTERNAL MEDICINE

## 2025-03-26 PROCEDURE — 99204 OFFICE O/P NEW MOD 45 MIN: CPT | Performed by: INTERNAL MEDICINE

## 2025-03-26 PROCEDURE — 1160F RVW MEDS BY RX/DR IN RCRD: CPT | Performed by: INTERNAL MEDICINE

## 2025-03-26 PROCEDURE — 3074F SYST BP LT 130 MM HG: CPT | Performed by: INTERNAL MEDICINE

## 2025-03-26 PROCEDURE — 3008F BODY MASS INDEX DOCD: CPT | Performed by: INTERNAL MEDICINE

## 2025-03-26 PROCEDURE — 1126F AMNT PAIN NOTED NONE PRSNT: CPT | Performed by: INTERNAL MEDICINE

## 2025-03-26 PROCEDURE — G2211 COMPLEX E/M VISIT ADD ON: HCPCS | Performed by: INTERNAL MEDICINE

## 2025-03-26 PROCEDURE — 3079F DIAST BP 80-89 MM HG: CPT | Performed by: INTERNAL MEDICINE

## 2025-03-26 PROCEDURE — 1036F TOBACCO NON-USER: CPT | Performed by: INTERNAL MEDICINE

## 2025-03-26 RX ORDER — ATORVASTATIN CALCIUM 40 MG/1
40 TABLET, FILM COATED ORAL DAILY
Qty: 90 TABLET | Refills: 3 | Status: SHIPPED | OUTPATIENT
Start: 2025-03-26 | End: 2026-03-26

## 2025-03-26 RX ORDER — ATENOLOL AND CHLORTHALIDONE TABLET 100; 25 MG/1; MG/1
1 TABLET ORAL DAILY
Qty: 90 TABLET | Refills: 3 | Status: SHIPPED | OUTPATIENT
Start: 2025-03-26 | End: 2026-03-26

## 2025-03-26 ASSESSMENT — LIFESTYLE VARIABLES
HOW OFTEN DO YOU HAVE A DRINK CONTAINING ALCOHOL: MONTHLY OR LESS
AUDIT-C TOTAL SCORE: 1
HOW OFTEN DO YOU HAVE SIX OR MORE DRINKS ON ONE OCCASION: NEVER
HAS A RELATIVE, FRIEND, DOCTOR, OR ANOTHER HEALTH PROFESSIONAL EXPRESSED CONCERN ABOUT YOUR DRINKING OR SUGGESTED YOU CUT DOWN: NO
HAVE YOU OR SOMEONE ELSE BEEN INJURED AS A RESULT OF YOUR DRINKING: NO
HOW MANY STANDARD DRINKS CONTAINING ALCOHOL DO YOU HAVE ON A TYPICAL DAY: 1 OR 2
SKIP TO QUESTIONS 9-10: 1
AUDIT TOTAL SCORE: 1

## 2025-03-26 ASSESSMENT — ENCOUNTER SYMPTOMS
DEPRESSION: 0
LOSS OF SENSATION IN FEET: 0
OCCASIONAL FEELINGS OF UNSTEADINESS: 0

## 2025-03-26 ASSESSMENT — PAIN SCALES - GENERAL: PAINLEVEL_OUTOF10: 0-NO PAIN

## 2025-03-26 NOTE — PROGRESS NOTES
Subjective   Chief Complaint   Patient presents with    Results     Mrs\Ms. Hopkins is present to discuss the results of ECG she had done while in [place] for [place]       74-year-old female patient with a history of hypertension, patient was.  Patient with a history of SVT as well as hypertension.  Patient had AV oz reentry tachycardia ablation done about 5 years ago.  Patient currently on amlodipine, aspirin, lisinopril and Metropol tartrate.  Patient had a left foot soft cast surgery.  Patient had a EKG done at the podiatrist office essentially showed sinus rhythm 83 with a nonspecific ST patient seen.  EKG had artifacts.    Past Medical History:   Diagnosis Date    Endometriosis     Other specified diseases of intestine 01/27/2017    Colonic mass    Paroxysmal tachycardia 09/16/2023    Personal history of other diseases of the musculoskeletal system and connective tissue     History of osteoarthritis    Supraventricular tachycardia (CMS-HCC)     Supraventricular tachycardia by ECG     Past Surgical History:   Procedure Laterality Date    EYE SURGERY      OTHER SURGICAL HISTORY  01/27/2017    Laryngeal Surgery    OTHER SURGICAL HISTORY  03/11/2020    Colectomy    OTHER SURGICAL HISTORY  04/10/2017    Laparoscopy Partial Colectomy    TONSILLECTOMY  01/27/2017    Tonsillectomy    TOTAL ABDOMINAL HYSTERECTOMY W/ BILATERAL SALPINGOOPHORECTOMY  01/27/2017    Total Abdominal Hysterectomy With Removal Of Both Ovaries     No relevant family history has been documented for this patient.  Current Outpatient Medications   Medication Sig Dispense Refill    aspirin 81 mg chewable tablet Chew 1 tablet (81 mg) once daily.      colestipol (Colestid) 1 gram tablet Take 1 tablet (1 g) by mouth once daily.      dicyclomine (Bentyl) 10 mg capsule Take 2 capsules (20 mg) by mouth 3 times a day. prn      ferrous sulfate, 325 mg ferrous sulfate, tablet Take 1 tablet (325 mg) by mouth once daily with breakfast.      fluticasone (Flonase) 50  mcg/actuation nasal spray Administer 1 spray into each nostril once daily. Shake gently. Before first use, prime pump. After use, clean tip and replace cap. 16 g 5    folic acid (Folvite) 400 mcg tablet Take 1 tablet (0.4 mg) by mouth once daily. 30 tablet 2    glucos sul 2KCl/msm/chond/C/Mn (GLUCOSAMINE CHONDROITIN ORAL) Take 1 tablet by mouth once daily. Glucosamine Chondr 1500 Complx      lansoprazole (Prevacid) 30 mg DR capsule Take 1 capsule (30 mg) by mouth once daily. Do not crush or chew. 90 capsule 2    leflunomide (Arava) 20 mg tablet Take 1 tablet (20 mg) by mouth once daily. 90 tablet 0    meloxicam (Mobic) 15 mg tablet Take 1 tablet (15 mg) by mouth once daily. 90 tablet 0    methotrexate (Trexall) 2.5 mg tablet Take 6 tablets (15 mg total) by mouth 1 (one) time per week.      oxybutynin XL (Ditropan-XL) 15 mg 24 hr tablet Take 1 tablet (15 mg) by mouth once daily.      pilocarpine (Salagen, pilocarpine,) 5 mg tablet Take 1 tablet (5 mg) by mouth 3 times a day. 90 tablet 3    atenoloL-chlorthalidone (Tenoretic 100) 100-25 mg tablet Take 1 tablet by mouth once daily. 90 tablet 3    atorvastatin (Lipitor) 40 mg tablet Take 1 tablet (40 mg) by mouth once daily. 90 tablet 3    cholecalciferol (Vitamin D3) 25 MCG (1000 UT) tablet Take 1 tablet (25 mcg) by mouth once daily. (Patient not taking: Reported on 3/26/2025)      denosumab (Prolia) 60 mg/mL syringe Inject 1 mL (60 mg total) under the skin every 6 months. (Patient not taking: Reported on 3/26/2025) 1 mL 1    hydrocortisone-iodoquinoL (Dermazene) 1-1 % cream in packet Dermazene 1-1 % External Cream APPLY EVERY DAY AS DIRECTED Quantity: 28.4 Refills: 0 Berta FERRARA, Jose Angel Start : 31-Mar-2017 Active (Patient not taking: Reported on 3/26/2025)      traMADol (Ultram) 50 mg tablet Take 1 tablet (50 mg) by mouth every 8 hours if needed for severe pain (7 - 10) for up to 21 doses. (Patient not taking: Reported on 3/26/2025) 21 tablet 0     No current  "facility-administered medications for this visit.      reports that she has never smoked. She has never been exposed to tobacco smoke. She has never used smokeless tobacco. She reports current alcohol use. She reports that she does not use drugs.  Allergies:  Cephalexin, Morphine, Propoxyphene n-acetaminophen, and Propoxyphene    ROS: See HPI  CONSTITUTIONAL: Chills- none. Fever- none. Weight change appropriate for age.  HEENT: Headache- Negative.  Change in vision- none.  Ear pain- none. Nasal congestion- none. Post-nasal drip-none.  Sore throat-none.  CARDIOLOGY: Chest pain- none.  Leg edema-trace.  Murmurs-soft systolic.  Palpitation- none.  RESPIRATORY: Denies any shortness of breath.  GI: Abdominal pain- none.  Change in bowel habits- none.  Constipation- none.  Diarrhea- none.  Nausea- none.  Vomiting- none.  MUSCULOSKELETAL: Joint pain- none.  Muscle aches- none.  DERMATOLOGY: Rash- none.  NEUROLOGY: Dizziness- none.   Headache- none.  PSYCHIATRY: Denies any depression or anxiety     Vitals:    03/26/25 1558   BP: 129/80   Pulse: 86   Resp: 14   SpO2: 95%   Weight: 65.8 kg (145 lb)   Height: 1.651 m (5' 5\")   PainSc: 0-No pain      BMI:Body mass index is 24.13 kg/m².   General Cardiology:  General Appearance: Alert, oriented and in no acute distress.  HEENT: extra ocular movements intact (EOMI), pupils equal,  round, reactive to light and accommodation (PERRLA).  Carotid Upstroke: no bruit, normal.  Jugular Venous Distention (JVD): flat.  Chest: normal.  Lungs: Clear to auscultation,   Heart Sounds: no S3 or S4, normal S1, S2, regular rate.  Murmur, Click, Gallop: no systolic murmur.  Abdomen: no hepatomegaly, no masses felt, soft.  Extremities: no leg edema, left foot status post surgery now with soft cast  Peripheral pulses: 2 plus bilateral.  NEUROLOGY Cranial nerves II-XII grossly intact.     Patient Active Problem List   Diagnosis    Gastroesophageal reflux disease    Primary hypertension    Mass of " colon    Osteoarthritis    Rheumatoid arthritis    Hypothyroidism    Mixed stress and urge urinary incontinence    Pure hypercholesterolemia    Rheumatoid arthritis involving both hands (Multi)    Vitamin D deficiency    SVT (supraventricular tachycardia) (CMS-HCC)    Leukocytes in urine    Ptosis of eyelid    Other osteoporosis without current pathological fracture    Supraventricular tachycardia determined by electrocardiography (CMS-HCC)    Allergies    Mixed hyperlipidemia     Assessment/Plan             Problem List Items Addressed This Visit       Primary hypertension - Primary    Relevant Medications    atenoloL-chlorthalidone (Tenoretic 100) 100-25 mg tablet    atorvastatin (Lipitor) 40 mg tablet    Hypothyroidism    Relevant Medications    atenoloL-chlorthalidone (Tenoretic 100) 100-25 mg tablet    atorvastatin (Lipitor) 40 mg tablet    Supraventricular tachycardia determined by electrocardiography (CMS-[facility])    Relevant Medications    atenoloL-chlorthalidone (Tenoretic 100) 100-25 mg tablet    atorvastatin (Lipitor) 40 mg tablet    Mixed hyperlipidemia      73-year-old female patient with a history of AVNRT ablation in past, hypertension, hypothyroidism.  Also on left foot surgery the past.    1.  Hypertension: Patient has a gum swelling with amlodipine advised to get off amlodipine at the moment I will discontinue morphine, metoprolol as well as the lisinopril.  I will switch to Tenoretic 100/25 mg once a day to optimize blood pressure treatment plans.  Advised patient check blood pressure twice a day for next 10 days.  2.  Hyperlipidemia: Patient had a lipid profile done about 4 months ago which essentially showed total cholesterol 243 with LDL is 162.  I will add patient's on atorvastatin 20 mg tablet p.o. at bedtime.    Advised patient to avoid lunch meats, canned soups, pizzas, bread rolls, and sandwiches. Advised patient to limit salt intake 1,500 mg daily. Advised patient to exercise 30 mins/3 times  a week including treadmill or aerobic type, Goal to achieve 65% target HR.    Diet and exercise reviewed with patient..advice to walk about 10,000 steps or about 2 hours during day time. Cut back on salt, sugar and flour.    Pt. care time is spent includes review of diagnostic tests, labs, radiographs, EKGs, old echoes, cardiac work-up and coordination of care. Assessment, impression and plans are reflected in the note above as well as the orders.    Singh Marquez MD

## 2025-03-26 NOTE — H&P (VIEW-ONLY)
Subjective   Chief Complaint   Patient presents with    Results     Mrs\Ms. Hopkins is present to discuss the results of ECG she had done while in [place] for [place]       74-year-old female patient with a history of hypertension, patient was.  Patient with a history of SVT as well as hypertension.  Patient had AV oz reentry tachycardia ablation done about 5 years ago.  Patient currently on amlodipine, aspirin, lisinopril and Metropol tartrate.  Patient had a left foot soft cast surgery.  Patient had a EKG done at the podiatrist office essentially showed sinus rhythm 83 with a nonspecific ST patient seen.  EKG had artifacts.    Past Medical History:   Diagnosis Date    Endometriosis     Other specified diseases of intestine 01/27/2017    Colonic mass    Paroxysmal tachycardia 09/16/2023    Personal history of other diseases of the musculoskeletal system and connective tissue     History of osteoarthritis    Supraventricular tachycardia (CMS-HCC)     Supraventricular tachycardia by ECG     Past Surgical History:   Procedure Laterality Date    EYE SURGERY      OTHER SURGICAL HISTORY  01/27/2017    Laryngeal Surgery    OTHER SURGICAL HISTORY  03/11/2020    Colectomy    OTHER SURGICAL HISTORY  04/10/2017    Laparoscopy Partial Colectomy    TONSILLECTOMY  01/27/2017    Tonsillectomy    TOTAL ABDOMINAL HYSTERECTOMY W/ BILATERAL SALPINGOOPHORECTOMY  01/27/2017    Total Abdominal Hysterectomy With Removal Of Both Ovaries     No relevant family history has been documented for this patient.  Current Outpatient Medications   Medication Sig Dispense Refill    aspirin 81 mg chewable tablet Chew 1 tablet (81 mg) once daily.      colestipol (Colestid) 1 gram tablet Take 1 tablet (1 g) by mouth once daily.      dicyclomine (Bentyl) 10 mg capsule Take 2 capsules (20 mg) by mouth 3 times a day. prn      ferrous sulfate, 325 mg ferrous sulfate, tablet Take 1 tablet (325 mg) by mouth once daily with breakfast.      fluticasone (Flonase) 50  mcg/actuation nasal spray Administer 1 spray into each nostril once daily. Shake gently. Before first use, prime pump. After use, clean tip and replace cap. 16 g 5    folic acid (Folvite) 400 mcg tablet Take 1 tablet (0.4 mg) by mouth once daily. 30 tablet 2    glucos sul 2KCl/msm/chond/C/Mn (GLUCOSAMINE CHONDROITIN ORAL) Take 1 tablet by mouth once daily. Glucosamine Chondr 1500 Complx      lansoprazole (Prevacid) 30 mg DR capsule Take 1 capsule (30 mg) by mouth once daily. Do not crush or chew. 90 capsule 2    leflunomide (Arava) 20 mg tablet Take 1 tablet (20 mg) by mouth once daily. 90 tablet 0    meloxicam (Mobic) 15 mg tablet Take 1 tablet (15 mg) by mouth once daily. 90 tablet 0    methotrexate (Trexall) 2.5 mg tablet Take 6 tablets (15 mg total) by mouth 1 (one) time per week.      oxybutynin XL (Ditropan-XL) 15 mg 24 hr tablet Take 1 tablet (15 mg) by mouth once daily.      pilocarpine (Salagen, pilocarpine,) 5 mg tablet Take 1 tablet (5 mg) by mouth 3 times a day. 90 tablet 3    atenoloL-chlorthalidone (Tenoretic 100) 100-25 mg tablet Take 1 tablet by mouth once daily. 90 tablet 3    atorvastatin (Lipitor) 40 mg tablet Take 1 tablet (40 mg) by mouth once daily. 90 tablet 3    cholecalciferol (Vitamin D3) 25 MCG (1000 UT) tablet Take 1 tablet (25 mcg) by mouth once daily. (Patient not taking: Reported on 3/26/2025)      denosumab (Prolia) 60 mg/mL syringe Inject 1 mL (60 mg total) under the skin every 6 months. (Patient not taking: Reported on 3/26/2025) 1 mL 1    hydrocortisone-iodoquinoL (Dermazene) 1-1 % cream in packet Dermazene 1-1 % External Cream APPLY EVERY DAY AS DIRECTED Quantity: 28.4 Refills: 0 Berta FERRARA, Jose Angel Start : 31-Mar-2017 Active (Patient not taking: Reported on 3/26/2025)      traMADol (Ultram) 50 mg tablet Take 1 tablet (50 mg) by mouth every 8 hours if needed for severe pain (7 - 10) for up to 21 doses. (Patient not taking: Reported on 3/26/2025) 21 tablet 0     No current  "facility-administered medications for this visit.      reports that she has never smoked. She has never been exposed to tobacco smoke. She has never used smokeless tobacco. She reports current alcohol use. She reports that she does not use drugs.  Allergies:  Cephalexin, Morphine, Propoxyphene n-acetaminophen, and Propoxyphene    ROS: See HPI  CONSTITUTIONAL: Chills- none. Fever- none. Weight change appropriate for age.  HEENT: Headache- Negative.  Change in vision- none.  Ear pain- none. Nasal congestion- none. Post-nasal drip-none.  Sore throat-none.  CARDIOLOGY: Chest pain- none.  Leg edema-trace.  Murmurs-soft systolic.  Palpitation- none.  RESPIRATORY: Denies any shortness of breath.  GI: Abdominal pain- none.  Change in bowel habits- none.  Constipation- none.  Diarrhea- none.  Nausea- none.  Vomiting- none.  MUSCULOSKELETAL: Joint pain- none.  Muscle aches- none.  DERMATOLOGY: Rash- none.  NEUROLOGY: Dizziness- none.   Headache- none.  PSYCHIATRY: Denies any depression or anxiety     Vitals:    03/26/25 1558   BP: 129/80   Pulse: 86   Resp: 14   SpO2: 95%   Weight: 65.8 kg (145 lb)   Height: 1.651 m (5' 5\")   PainSc: 0-No pain      BMI:Body mass index is 24.13 kg/m².   General Cardiology:  General Appearance: Alert, oriented and in no acute distress.  HEENT: extra ocular movements intact (EOMI), pupils equal,  round, reactive to light and accommodation (PERRLA).  Carotid Upstroke: no bruit, normal.  Jugular Venous Distention (JVD): flat.  Chest: normal.  Lungs: Clear to auscultation,   Heart Sounds: no S3 or S4, normal S1, S2, regular rate.  Murmur, Click, Gallop: no systolic murmur.  Abdomen: no hepatomegaly, no masses felt, soft.  Extremities: no leg edema, left foot status post surgery now with soft cast  Peripheral pulses: 2 plus bilateral.  NEUROLOGY Cranial nerves II-XII grossly intact.     Patient Active Problem List   Diagnosis    Gastroesophageal reflux disease    Primary hypertension    Mass of " colon    Osteoarthritis    Rheumatoid arthritis    Hypothyroidism    Mixed stress and urge urinary incontinence    Pure hypercholesterolemia    Rheumatoid arthritis involving both hands (Multi)    Vitamin D deficiency    SVT (supraventricular tachycardia) (CMS-HCC)    Leukocytes in urine    Ptosis of eyelid    Other osteoporosis without current pathological fracture    Supraventricular tachycardia determined by electrocardiography (CMS-HCC)    Allergies    Mixed hyperlipidemia     Assessment/Plan             Problem List Items Addressed This Visit       Primary hypertension - Primary    Relevant Medications    atenoloL-chlorthalidone (Tenoretic 100) 100-25 mg tablet    atorvastatin (Lipitor) 40 mg tablet    Hypothyroidism    Relevant Medications    atenoloL-chlorthalidone (Tenoretic 100) 100-25 mg tablet    atorvastatin (Lipitor) 40 mg tablet    Supraventricular tachycardia determined by electrocardiography (CMS-[facility])    Relevant Medications    atenoloL-chlorthalidone (Tenoretic 100) 100-25 mg tablet    atorvastatin (Lipitor) 40 mg tablet    Mixed hyperlipidemia      73-year-old female patient with a history of AVNRT ablation in past, hypertension, hypothyroidism.  Also on left foot surgery the past.    1.  Hypertension: Patient has a gum swelling with amlodipine advised to get off amlodipine at the moment I will discontinue morphine, metoprolol as well as the lisinopril.  I will switch to Tenoretic 100/25 mg once a day to optimize blood pressure treatment plans.  Advised patient check blood pressure twice a day for next 10 days.  2.  Hyperlipidemia: Patient had a lipid profile done about 4 months ago which essentially showed total cholesterol 243 with LDL is 162.  I will add patient's on atorvastatin 20 mg tablet p.o. at bedtime.    Advised patient to avoid lunch meats, canned soups, pizzas, bread rolls, and sandwiches. Advised patient to limit salt intake 1,500 mg daily. Advised patient to exercise 30 mins/3 times  a week including treadmill or aerobic type, Goal to achieve 65% target HR.    Diet and exercise reviewed with patient..advice to walk about 10,000 steps or about 2 hours during day time. Cut back on salt, sugar and flour.    Pt. care time is spent includes review of diagnostic tests, labs, radiographs, EKGs, old echoes, cardiac work-up and coordination of care. Assessment, impression and plans are reflected in the note above as well as the orders.    Singh Marquez MD

## 2025-04-23 ENCOUNTER — ANESTHESIA (OUTPATIENT)
Dept: OPERATING ROOM | Facility: HOSPITAL | Age: 75
End: 2025-04-23
Payer: MEDICARE

## 2025-04-23 ENCOUNTER — ANESTHESIA EVENT (OUTPATIENT)
Dept: OPERATING ROOM | Facility: HOSPITAL | Age: 75
End: 2025-04-23
Payer: MEDICARE

## 2025-04-23 ENCOUNTER — APPOINTMENT (OUTPATIENT)
Dept: CARDIOLOGY | Facility: HOSPITAL | Age: 75
End: 2025-04-23
Payer: MEDICARE

## 2025-04-23 ENCOUNTER — PHARMACY VISIT (OUTPATIENT)
Dept: PHARMACY | Facility: CLINIC | Age: 75
End: 2025-04-23
Payer: MEDICARE

## 2025-04-23 ENCOUNTER — TRANSCRIBE ORDERS (OUTPATIENT)
Dept: INFUSION THERAPY | Facility: CLINIC | Age: 75
End: 2025-04-23
Payer: MEDICARE

## 2025-04-23 ENCOUNTER — HOSPITAL ENCOUNTER (OUTPATIENT)
Facility: HOSPITAL | Age: 75
Setting detail: OUTPATIENT SURGERY
Discharge: HOME | End: 2025-04-23
Attending: PODIATRIST | Admitting: STUDENT IN AN ORGANIZED HEALTH CARE EDUCATION/TRAINING PROGRAM
Payer: MEDICARE

## 2025-04-23 VITALS
RESPIRATION RATE: 18 BRPM | SYSTOLIC BLOOD PRESSURE: 154 MMHG | OXYGEN SATURATION: 99 % | WEIGHT: 145.06 LBS | DIASTOLIC BLOOD PRESSURE: 79 MMHG | HEART RATE: 74 BPM | TEMPERATURE: 97.3 F | BODY MASS INDEX: 24.14 KG/M2

## 2025-04-23 DIAGNOSIS — L03.115 CELLULITIS OF RIGHT LOWER EXTREMITY: Primary | ICD-10-CM

## 2025-04-23 DIAGNOSIS — T84.84XA PAIN IN PROSTHETIC JOINT, INITIAL ENCOUNTER: ICD-10-CM

## 2025-04-23 DIAGNOSIS — M81.8 OTHER OSTEOPOROSIS WITHOUT CURRENT PATHOLOGICAL FRACTURE: Primary | ICD-10-CM

## 2025-04-23 DIAGNOSIS — R60.0 LOCALIZED EDEMA: ICD-10-CM

## 2025-04-23 DIAGNOSIS — L97.526 DIABETIC ULCER OF TOE OF LEFT FOOT ASSOCIATED WITH DIABETES MELLITUS DUE TO UNDERLYING CONDITION, WITH BONE INVOLVEMENT WITHOUT EVIDENCE OF NECROSIS: ICD-10-CM

## 2025-04-23 DIAGNOSIS — E08.621 DIABETIC ULCER OF TOE OF LEFT FOOT ASSOCIATED WITH DIABETES MELLITUS DUE TO UNDERLYING CONDITION, WITH BONE INVOLVEMENT WITHOUT EVIDENCE OF NECROSIS: ICD-10-CM

## 2025-04-23 DIAGNOSIS — M79.672 LEFT FOOT PAIN: ICD-10-CM

## 2025-04-23 DIAGNOSIS — R26.2 DIFFICULTY WALKING: ICD-10-CM

## 2025-04-23 LAB
ATRIAL RATE: 87 BPM
P AXIS: 75 DEGREES
P OFFSET: 169 MS
P ONSET: 123 MS
PR INTERVAL: 188 MS
Q ONSET: 217 MS
QRS COUNT: 15 BEATS
QRS DURATION: 96 MS
QT INTERVAL: 394 MS
QTC CALCULATION(BAZETT): 474 MS
QTC FREDERICIA: 445 MS
R AXIS: 22 DEGREES
T AXIS: 65 DEGREES
T OFFSET: 414 MS
VENTRICULAR RATE: 87 BPM

## 2025-04-23 PROCEDURE — 7100000009 HC PHASE TWO TIME - INITIAL BASE CHARGE: Performed by: PODIATRIST

## 2025-04-23 PROCEDURE — 93005 ELECTROCARDIOGRAM TRACING: CPT

## 2025-04-23 PROCEDURE — 7100000010 HC PHASE TWO TIME - EACH INCREMENTAL 1 MINUTE: Performed by: PODIATRIST

## 2025-04-23 PROCEDURE — 2720000007 HC OR 272 NO HCPCS: Performed by: PODIATRIST

## 2025-04-23 PROCEDURE — 2500000004 HC RX 250 GENERAL PHARMACY W/ HCPCS (ALT 636 FOR OP/ED): Performed by: PODIATRIST

## 2025-04-23 PROCEDURE — RXMED WILLOW AMBULATORY MEDICATION CHARGE

## 2025-04-23 PROCEDURE — C1762 CONN TISS, HUMAN(INC FASCIA): HCPCS | Performed by: PODIATRIST

## 2025-04-23 PROCEDURE — 3600000004 HC OR TIME - INITIAL BASE CHARGE - PROCEDURE LEVEL FOUR: Performed by: PODIATRIST

## 2025-04-23 PROCEDURE — 93010 ELECTROCARDIOGRAM REPORT: CPT | Performed by: INTERNAL MEDICINE

## 2025-04-23 PROCEDURE — 7100000001 HC RECOVERY ROOM TIME - INITIAL BASE CHARGE: Performed by: PODIATRIST

## 2025-04-23 PROCEDURE — 1210000001 HC SEMI-PRIVATE ROOM DAILY

## 2025-04-23 PROCEDURE — 2500000004 HC RX 250 GENERAL PHARMACY W/ HCPCS (ALT 636 FOR OP/ED): Mod: JW | Performed by: ANESTHESIOLOGIST ASSISTANT

## 2025-04-23 PROCEDURE — 3700000001 HC GENERAL ANESTHESIA TIME - INITIAL BASE CHARGE: Performed by: PODIATRIST

## 2025-04-23 PROCEDURE — 87070 CULTURE OTHR SPECIMN AEROBIC: CPT | Mod: TRILAB | Performed by: PODIATRIST

## 2025-04-23 PROCEDURE — 2500000004 HC RX 250 GENERAL PHARMACY W/ HCPCS (ALT 636 FOR OP/ED): Mod: JZ

## 2025-04-23 PROCEDURE — 7100000002 HC RECOVERY ROOM TIME - EACH INCREMENTAL 1 MINUTE: Performed by: PODIATRIST

## 2025-04-23 PROCEDURE — 3600000009 HC OR TIME - EACH INCREMENTAL 1 MINUTE - PROCEDURE LEVEL FOUR: Performed by: PODIATRIST

## 2025-04-23 PROCEDURE — 3700000002 HC GENERAL ANESTHESIA TIME - EACH INCREMENTAL 1 MINUTE: Performed by: PODIATRIST

## 2025-04-23 DEVICE — IMPLANTABLE DEVICE: Type: IMPLANTABLE DEVICE | Site: FOOT | Status: FUNCTIONAL

## 2025-04-23 RX ORDER — COLESTIPOL HYDROCHLORIDE 1 G/1
1 TABLET ORAL DAILY
Status: DISCONTINUED | OUTPATIENT
Start: 2025-04-24 | End: 2025-04-23

## 2025-04-23 RX ORDER — FENTANYL CITRATE 50 UG/ML
INJECTION, SOLUTION INTRAMUSCULAR; INTRAVENOUS AS NEEDED
Status: DISCONTINUED | OUTPATIENT
Start: 2025-04-23 | End: 2025-04-23

## 2025-04-23 RX ORDER — ATENOLOL AND CHLORTHALIDONE TABLET 100; 25 MG/1; MG/1
1 TABLET ORAL DAILY
Status: DISCONTINUED | OUTPATIENT
Start: 2025-04-24 | End: 2025-04-23

## 2025-04-23 RX ORDER — HYDRALAZINE HYDROCHLORIDE 20 MG/ML
5 INJECTION INTRAMUSCULAR; INTRAVENOUS EVERY 30 MIN PRN
Status: DISCONTINUED | OUTPATIENT
Start: 2025-04-23 | End: 2025-04-23 | Stop reason: HOSPADM

## 2025-04-23 RX ORDER — MIDAZOLAM HYDROCHLORIDE 1 MG/ML
INJECTION, SOLUTION INTRAMUSCULAR; INTRAVENOUS AS NEEDED
Status: DISCONTINUED | OUTPATIENT
Start: 2025-04-23 | End: 2025-04-23

## 2025-04-23 RX ORDER — ATORVASTATIN CALCIUM 40 MG/1
40 TABLET, FILM COATED ORAL DAILY
Status: DISCONTINUED | OUTPATIENT
Start: 2025-04-24 | End: 2025-04-23

## 2025-04-23 RX ORDER — OXYBUTYNIN CHLORIDE 5 MG/1
5 TABLET ORAL 3 TIMES DAILY
Status: DISCONTINUED | OUTPATIENT
Start: 2025-04-23 | End: 2025-04-23

## 2025-04-23 RX ORDER — CIPROFLOXACIN 500 MG/1
500 TABLET ORAL 2 TIMES DAILY
Qty: 20 TABLET | Refills: 0 | Status: SHIPPED | OUTPATIENT
Start: 2025-04-23 | End: 2025-05-03

## 2025-04-23 RX ORDER — NAPROXEN SODIUM 220 MG/1
81 TABLET, FILM COATED ORAL DAILY
Status: DISCONTINUED | OUTPATIENT
Start: 2025-04-23 | End: 2025-04-23

## 2025-04-23 RX ORDER — FOLIC ACID 0.4 MG
0.4 TABLET ORAL DAILY
Status: DISCONTINUED | OUTPATIENT
Start: 2025-04-24 | End: 2025-04-23

## 2025-04-23 RX ORDER — FENTANYL CITRATE 50 UG/ML
50 INJECTION, SOLUTION INTRAMUSCULAR; INTRAVENOUS EVERY 5 MIN PRN
Status: DISCONTINUED | OUTPATIENT
Start: 2025-04-23 | End: 2025-04-23 | Stop reason: HOSPADM

## 2025-04-23 RX ORDER — OXYCODONE HYDROCHLORIDE 5 MG/1
5 TABLET ORAL EVERY 6 HOURS PRN
Refills: 0 | Status: DISCONTINUED | OUTPATIENT
Start: 2025-04-23 | End: 2025-04-23 | Stop reason: HOSPADM

## 2025-04-23 RX ORDER — CLINDAMYCIN HYDROCHLORIDE 300 MG/1
300 CAPSULE ORAL 4 TIMES DAILY
Qty: 40 CAPSULE | Refills: 0 | Status: SHIPPED | OUTPATIENT
Start: 2025-04-23 | End: 2025-05-03

## 2025-04-23 RX ORDER — PANTOPRAZOLE SODIUM 40 MG/1
40 TABLET, DELAYED RELEASE ORAL
Status: DISCONTINUED | OUTPATIENT
Start: 2025-04-24 | End: 2025-04-23

## 2025-04-23 RX ORDER — ALBUTEROL SULFATE 0.83 MG/ML
2.5 SOLUTION RESPIRATORY (INHALATION) ONCE AS NEEDED
Status: DISCONTINUED | OUTPATIENT
Start: 2025-04-23 | End: 2025-04-23 | Stop reason: HOSPADM

## 2025-04-23 RX ORDER — CLINDAMYCIN PHOSPHATE 900 MG/50ML
900 INJECTION, SOLUTION INTRAVENOUS ONCE
Status: COMPLETED | OUTPATIENT
Start: 2025-04-23 | End: 2025-04-23

## 2025-04-23 RX ORDER — LEFLUNOMIDE 10 MG/1
20 TABLET ORAL DAILY
Status: DISCONTINUED | OUTPATIENT
Start: 2025-04-24 | End: 2025-04-23

## 2025-04-23 RX ORDER — PROPOFOL 10 MG/ML
INJECTION, EMULSION INTRAVENOUS AS NEEDED
Status: DISCONTINUED | OUTPATIENT
Start: 2025-04-23 | End: 2025-04-23

## 2025-04-23 RX ORDER — OXYCODONE HYDROCHLORIDE 5 MG/1
CAPSULE ORAL EVERY 6 HOURS PRN
Status: ON HOLD | COMMUNITY
End: 2025-04-23

## 2025-04-23 RX ORDER — FENTANYL CITRATE 50 UG/ML
25 INJECTION, SOLUTION INTRAMUSCULAR; INTRAVENOUS EVERY 5 MIN PRN
Status: DISCONTINUED | OUTPATIENT
Start: 2025-04-23 | End: 2025-04-23 | Stop reason: HOSPADM

## 2025-04-23 RX ORDER — SODIUM CHLORIDE, SODIUM LACTATE, POTASSIUM CHLORIDE, CALCIUM CHLORIDE 600; 310; 30; 20 MG/100ML; MG/100ML; MG/100ML; MG/100ML
100 INJECTION, SOLUTION INTRAVENOUS CONTINUOUS
Status: ACTIVE | OUTPATIENT
Start: 2025-04-23 | End: 2025-04-23

## 2025-04-23 RX ORDER — IPRATROPIUM BROMIDE 0.5 MG/2.5ML
500 SOLUTION RESPIRATORY (INHALATION) ONCE
Status: DISCONTINUED | OUTPATIENT
Start: 2025-04-23 | End: 2025-04-23 | Stop reason: HOSPADM

## 2025-04-23 RX ORDER — LABETALOL HYDROCHLORIDE 5 MG/ML
5 INJECTION, SOLUTION INTRAVENOUS ONCE AS NEEDED
Status: DISCONTINUED | OUTPATIENT
Start: 2025-04-23 | End: 2025-04-23 | Stop reason: HOSPADM

## 2025-04-23 RX ORDER — OXYCODONE HYDROCHLORIDE 5 MG/1
5 TABLET ORAL EVERY 4 HOURS PRN
Qty: 30 TABLET | Refills: 0 | Status: SHIPPED | OUTPATIENT
Start: 2025-04-23

## 2025-04-23 RX ORDER — LIDOCAINE HYDROCHLORIDE 20 MG/ML
INJECTION, SOLUTION EPIDURAL; INFILTRATION; INTRACAUDAL; PERINEURAL AS NEEDED
Status: DISCONTINUED | OUTPATIENT
Start: 2025-04-23 | End: 2025-04-23

## 2025-04-23 RX ORDER — CHOLECALCIFEROL (VITAMIN D3) 25 MCG
25 TABLET ORAL DAILY
Status: DISCONTINUED | OUTPATIENT
Start: 2025-04-24 | End: 2025-04-23

## 2025-04-23 RX ORDER — LIDOCAINE HYDROCHLORIDE 10 MG/ML
5 INJECTION, SOLUTION INFILTRATION; PERINEURAL ONCE
Status: DISCONTINUED | OUTPATIENT
Start: 2025-04-23 | End: 2025-04-23 | Stop reason: HOSPADM

## 2025-04-23 RX ORDER — BUPIVACAINE HYDROCHLORIDE 5 MG/ML
INJECTION, SOLUTION PERINEURAL AS NEEDED
Status: DISCONTINUED | OUTPATIENT
Start: 2025-04-23 | End: 2025-04-23 | Stop reason: HOSPADM

## 2025-04-23 RX ORDER — SODIUM CHLORIDE, SODIUM LACTATE, POTASSIUM CHLORIDE, CALCIUM CHLORIDE 600; 310; 30; 20 MG/100ML; MG/100ML; MG/100ML; MG/100ML
INJECTION, SOLUTION INTRAVENOUS CONTINUOUS PRN
Status: DISCONTINUED | OUTPATIENT
Start: 2025-04-23 | End: 2025-04-23

## 2025-04-23 RX ORDER — ONDANSETRON HYDROCHLORIDE 2 MG/ML
INJECTION, SOLUTION INTRAVENOUS AS NEEDED
Status: DISCONTINUED | OUTPATIENT
Start: 2025-04-23 | End: 2025-04-23

## 2025-04-23 RX ORDER — METHOTREXATE 2.5 MG/1
15 TABLET ORAL
Status: DISCONTINUED | OUTPATIENT
Start: 2025-04-27 | End: 2025-04-23

## 2025-04-23 RX ADMIN — FENTANYL CITRATE 50 MCG: 50 INJECTION, SOLUTION INTRAMUSCULAR; INTRAVENOUS at 11:49

## 2025-04-23 RX ADMIN — MIDAZOLAM 2 MG: 1 INJECTION INTRAMUSCULAR; INTRAVENOUS at 11:31

## 2025-04-23 RX ADMIN — CLINDAMYCIN IN 5 PERCENT DEXTROSE 900 MG: 18 INJECTION, SOLUTION INTRAVENOUS at 11:31

## 2025-04-23 RX ADMIN — FENTANYL CITRATE 25 MCG: 50 INJECTION, SOLUTION INTRAMUSCULAR; INTRAVENOUS at 11:57

## 2025-04-23 RX ADMIN — SODIUM CHLORIDE, POTASSIUM CHLORIDE, SODIUM LACTATE AND CALCIUM CHLORIDE: 600; 310; 30; 20 INJECTION, SOLUTION INTRAVENOUS at 11:31

## 2025-04-23 RX ADMIN — DEXAMETHASONE SODIUM PHOSPHATE 8 MG: 4 INJECTION, SOLUTION INTRAMUSCULAR; INTRAVENOUS at 11:58

## 2025-04-23 RX ADMIN — PROPOFOL 150 MG: 10 INJECTION, EMULSION INTRAVENOUS at 11:49

## 2025-04-23 RX ADMIN — LIDOCAINE HYDROCHLORIDE 50 MG: 20 INJECTION, SOLUTION EPIDURAL; INFILTRATION; INTRACAUDAL; PERINEURAL at 11:49

## 2025-04-23 RX ADMIN — ONDANSETRON 4 MG: 2 INJECTION, SOLUTION INTRAMUSCULAR; INTRAVENOUS at 11:58

## 2025-04-23 SDOH — HEALTH STABILITY: MENTAL HEALTH: CURRENT SMOKER: 0

## 2025-04-23 ASSESSMENT — PAIN SCALES - GENERAL
PAINLEVEL_OUTOF10: 0 - NO PAIN
PAINLEVEL_OUTOF10: 0 - NO PAIN
PAIN_LEVEL: 0
PAINLEVEL_OUTOF10: 0 - NO PAIN

## 2025-04-23 ASSESSMENT — PAIN - FUNCTIONAL ASSESSMENT
PAIN_FUNCTIONAL_ASSESSMENT: 0-10

## 2025-04-23 ASSESSMENT — COLUMBIA-SUICIDE SEVERITY RATING SCALE - C-SSRS
6. HAVE YOU EVER DONE ANYTHING, STARTED TO DO ANYTHING, OR PREPARED TO DO ANYTHING TO END YOUR LIFE?: NO
2. HAVE YOU ACTUALLY HAD ANY THOUGHTS OF KILLING YOURSELF?: NO
1. IN THE PAST MONTH, HAVE YOU WISHED YOU WERE DEAD OR WISHED YOU COULD GO TO SLEEP AND NOT WAKE UP?: NO

## 2025-04-23 NOTE — POST-PROCEDURE NOTE
Pt up & voided. Discharge instructions reviewed with pt & understanding verbalized.Pt aware that she can not drive for 24 hours & needs someone with her for 24 hours.  Vitals stable. Prevena vac reviewed with pt, & booklet given & reviewed. Understanding verbalized. Dr Sanon & Dr Ibrahim in and spoke with pt. Pt left foot dressing dry & intact.

## 2025-04-23 NOTE — ANESTHESIA POSTPROCEDURE EVALUATION
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 Pain due to orthopedic hardware. Difficultly walking. Localized edema. Pain in right foot.)    Surgeons: Ricardo Ibrahim DPM Responsible Provider: Tricia Cabrera MD MPH    Anesthesia Type: general ASA Status: 2            Anesthesia Type: general    Vitals Value Taken Time   /73 04/23/25 13:16   Temp 36.3 °C (97.3 °F) 04/23/25 12:25   Pulse 67 04/23/25 13:18   Resp 15 04/23/25 13:18   SpO2 98 % 04/23/25 13:18   Vitals shown include unfiled device data.    Anesthesia Post Evaluation    Patient location during evaluation: PACU  Patient participation: complete - patient participated  Level of consciousness: awake and alert  Pain score: 0  Pain management: adequate  Multimodal analgesia pain management approach  Airway patency: patent  Two or more strategies used to mitigate risk of obstructive sleep apnea  Cardiovascular status: acceptable  Respiratory status: acceptable  Hydration status: acceptable  Postoperative Nausea and Vomiting: none        There were no known notable events for this encounter.    "

## 2025-04-23 NOTE — DISCHARGE INSTRUCTIONS
DR. FIORDALIZA BYRD, [facility] - PODIATRY DISCHARGE INSTRUCTIONS    YOU HAD GENERAL ANESTHESIA  You could become nauseated from the medications you have received. Resume diet with liquids. If you are unable to tolerate this then try a small serving of jello, soup, pudding, toast or other bland food. If this does not make you nauseated, you may resume your normal diet at the next mealtime. Increase your clear liquid intake.   You may experience a sore throat and/or a raspy throat for a couple of days. The IV site may also be tender or bruised.  You MUST have someone drive you home, and have a responsible adult stay with you for the next 24 hours  DO NOT drive a car or operate dangerous machinery or make important decisions for 24 hours.  Do NOT do any strenuous work or activity.    CARE INSTRUCTIONS  FOLLOW-UP APPOINTMENT with Dr. Byrd next week at the [place] office. Please call the office to schedule.   APPLY ICE behind your knee for the first 24 hrs (20 minutes on / 20 min. off while awake). After 24 hours use ice for comfort.  WEIGHT BEARING STATUS: Weight bearing in a CAM boot or Post-op shoe  Elevate surgical extremity as much as tolerated.    DRESSING  DO NOT remove dressing. You may loosen if necessary.  Do not shower unless you protect dressing with cast protector.  If post-operative dressing gets wet, please call office immediately.     MEDICATIONS  [  X  ]  PERCOCET (oxycodone-acetaminophen) 5-325 mg by mouth every 4-6 hours  as needed for pain.  [  X  ]  ALEVE (naproxen) 500 mg 1 tablet by mouth twice daily for pain and swelling.   [  X  ]  ZOFRAN (ondansetron) 4 mg 1 tablet by mouth every 6 hours as needed for nausea.   [      ]  ELIQUIS (apixaban) 1 tablet by mouth once a day. This is an anticoagulant (blood thinner) to prevent blood clots.  [      ]  FLEXERIL (cyclobenzaprine) 10mg 1 tablet by mouth every 8 hours as needed. This is a muscle relaxer.     Please take all post operative medications as  prescribed.  You may alternate pain medication with ibuprofen, naproxen or tylenol.    If you have any problems or notice any unusual symptoms such as bleeding, excessive pain, fever, redness, swelling and/or discharge, call Dr. Ricardo Ibrahim's office [facility] at [phone].    WHEN TO CALL YOUR DOCTOR:  Fever (>100.4°F or 38.0°C) or chills.  Incision problems such as redness, warmth, swelling, or foul smelling drainage.  Severe nausea or persistent vomiting.  Pain and swelling in your legs, especially if it is only on one side and not the other.  Pain with urination, cloudy urine, or foul smelling urine.  Or if you have any other problems or questions.  CALL 911 or go to the Emergency Department if you have any shortness of breath, difficulty breathing, or chest pain.

## 2025-04-23 NOTE — ANESTHESIA PROCEDURE NOTES
Airway  Date/Time: 4/23/2025 11:53 AM  Reason: elective    Airway not difficult    Staffing  Performed: attending   Authorized by: Tricia Cabrera MD MPH    Performed by: GABINO Garcia  Patient location during procedure: OR    Patient Condition  Indications for airway management: anesthesia  Patient position: sniffing  Planned trial extubation  Sedation level: deep     Final Airway Details   Preoxygenated: yes  Final airway type: supraglottic airway  Successful airway:   Size: 3  Number of attempts at approach: 2    Additional Comments  4 LMA attempted - too large

## 2025-04-23 NOTE — ANESTHESIA PREPROCEDURE EVALUATION
"Patient: Amanda Hopkins \"Emani\"    Procedure Information       Date/Time: 04/23/25 8861    Procedures:       Debridement of Ulcer Down to and Including Bone Lower Extremity (Right: Foot)      Incision and Drainage Bone Cortex Lower Extremtiy (Right: Foot)      Application CTM Graft Lower Extremity (Right: Foot)      Adjustment External Fixator Lower Extremity (Right: Foot)      Biopsy Bone Lower Extremity (Right: Foot) - C-ARM, ARTHREX: WRENCH (POSSIBLE) FOR MINI RAIL, CTM GRAFT,-* NO PRIOR AUTHORIZATION REQUIRED *AOA*  *AOA*CTM GRAFT    [facility]: [facility] / [facility]    Surgeons: Ricardo Ibrahim DPM            Relevant Problems   Anesthesia (within normal limits)      Cardiac   (+) Mixed hyperlipidemia   (+) Primary hypertension   (+) Pure hypercholesterolemia   (+) SVT (supraventricular tachycardia) (CMS-HCC) (S/p ablation several years ago, no problems since)   (+) Supraventricular tachycardia determined by electrocardiography      Pulmonary (within normal limits)      Neuro (within normal limits)      GI   (+) Gastroesophageal reflux disease (Rare sx)      /Renal (within normal limits)      Liver (within normal limits)      Endocrine   (+) Hypothyroidism      Hematology (within normal limits)      Musculoskeletal   (+) Osteoarthritis   (+) Rheumatoid arthritis   (+) Rheumatoid arthritis involving both hands (Multi)      HEENT (within normal limits)      ID (within normal limits)      Skin (within normal limits)      GYN (within normal limits)     Past Surgical History:   Procedure Laterality Date    EYE SURGERY      OTHER SURGICAL HISTORY  01/27/2017    Laryngeal Surgery    OTHER SURGICAL HISTORY  03/11/2020    Colectomy    OTHER SURGICAL HISTORY  04/10/2017    Laparoscopy Partial Colectomy    TONSILLECTOMY  01/27/2017    Tonsillectomy    TOTAL ABDOMINAL HYSTERECTOMY W/ BILATERAL SALPINGOOPHORECTOMY  01/27/2017    Total Abdominal Hysterectomy With Removal Of Both Ovaries       Clinical information " reviewed:   Tobacco  Allergies  Meds  Problems  Med Hx  Surg Hx  OB Status    Fam Hx  Soc Hx        NPO Detail:  NPO/Void Status  Date of Last Liquid: 04/22/25  Time of Last Liquid: 2300  Date of Last Solid: 04/22/25  Time of Last Solid: 2300  Time of Last Void: 0900      Lab Results   Component Value Date    WBC 7.1 02/10/2025    HGB 11.2 (L) 02/10/2025    HCT 35.5 (L) 02/10/2025    MCV 96 02/10/2025     02/10/2025       Chemistry    Lab Results   Component Value Date/Time     02/10/2025 1109    K 4.7 02/10/2025 1109     02/10/2025 1109    CO2 31 02/10/2025 1109    BUN 21 02/10/2025 1109    CREATININE 0.63 02/10/2025 1109    Lab Results   Component Value Date/Time    CALCIUM 9.4 02/10/2025 1109    ALKPHOS 52 02/10/2025 1109    AST 15 02/10/2025 1109    ALT 11 02/10/2025 1109    BILITOT 0.4 02/10/2025 1109             Physical Exam    Airway  Mallampati: II  TM distance: >3 FB  Neck ROM: full  Mouth opening: 3 or more finger widths     Cardiovascular    Dental    Pulmonary    Abdominal            Anesthesia Plan    History of general anesthesia?: yes  History of complications of general anesthesia?: no    ASA 2     general     The patient is not a current smoker.  Patient was not previously instructed to abstain from smoking on day of procedure.  Patient did not smoke on day of procedure.  Education provided regarding risk of obstructive sleep apnea.  intravenous induction   Trial extubation is planned.  Anesthetic plan and risks discussed with patient.  Use of blood products discussed with patient who consented to blood products.    Plan discussed with CRNA and CAA.

## 2025-04-23 NOTE — OP NOTE
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671]     Procedures  Debridement of Ulcer Down to and Including Bone Lower Extremity  95120 - [place] DEBRIDEMENT BONE 1ST 20 SQ CM/<    Application CTM Graft Lower Extremity  01112 - [place] REPAIR COMPLEX F/C/C/M/N/AX/G/H/F 1.1-2.5 CM    Application of wound vac  31572    Surgeons      * Ricardo Ibrahim - Primary    Resident/Fellow/Other Assistant:  Gerhard Viveros, Fellow   Karsten Sanon, PGY2  Janet Irving PGY2    Staff:   Circulator:   Scrub Person:     Anesthesia Staff: Anesthesiologist: Tricia Cabrera MD MPH    Procedure Summary  Anesthesia: Anesthesia type not filed in the log.  ASA: ASA status not filed in the log.  Estimated Blood Loss: 5mL  Intra-op Medications: Administrations occurring from 1535 to 1715 on 25:  * No intraprocedure medications in log *    Specimen: No specimens collected      Drains and/or Catheters: * " "None in log *    Tourniquet Times:   * Missing tourniquet times found for documented tourniquets in lo *     Implants:     Findings:     Indications: Amanda Hopkins \"Emani\" is an 74 y.o. 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 Pain due to orthopedic hardware. Difficultly walking. Localized edema. Pain in right foot..     Pre-Procedure Information:  In pre-op holding area, the operative extremity was clearly marked and the patient verified correct laterality of the marking.  The patient was brought to the operating room and placed on the operating table in the supine position.  A timeout was performed in which identification of the correct patient, procedure, location, and materials was done. A pneumatic thigh tourniquet was placed on the operative limb. The operative extremity was then scrubbed, prepped and draped in the usual aseptic manner.      Procedure Details:     Procedure 1:   At this time attention was then directed to the right operative lower extremity where an ulceration was noted with bone exposed, specifically metatarsal bone, on the dorsal forefoot. Sharp excisional debridement and was performed to remove non-viable tissue down to and including the level of bone. There was no purulence expressed. The area was inspected and any areas of tracking, especially along the course of tendons, were also drained and irrigated appropriately. After all areas of concern were adequately drained and irrigated, a post-lavage culture was taken. Post-debridement measurements were 3cm x 3cm x 0.8cm.     Procedure 2:   The CTM thin graft was placed within the surgical wound to enhance granulation of the tissue. The connective tissue particulate graft, CTM flow, was then obtained in a 10cc syringe. The allograft was then injected within and around the wound to aid and support the repair of tissue during secondary healing. "     Procedure 3:   The foam dressing designed for the wound vac was trimmed to fit the wound size and carefully placed into the wound bed. The foam was adjusted to ensure it was conforming to the contours of the wound and providing optimal contact with the wound surface. A clear adhesive drape was applied to secure the foam and create a sealed environment for the vacuum application. The suction tubing was then connected to the vacuum system, and the pressure setting was adjusted to [specific pressure setting, e.g., -125 mmHg] as per the recommended protocol. The wound vac was turned on, and the dressing was inspected for leaks. No leaks were noted, and the vacuum pressure was maintained throughout the procedure. The foot was then bandaged in a protective sterile dressing to prevent contamination of the wound vac system.    A dry, sterile dressing was applied consisting of 4x4 gauze, abdominal padding and a compression wrap. Normal capillary perfusion was noted to the distal aspect of operative extremity.    The patient tolerated the above procedure well. Patient returned to post operative recovery area with vital signs stable.      POSTOPERATIVE INFORMATION: The patient tolerated the procedure and anesthesia well. The patient was returned to PACU with vitals stable and neurovascular status intact to the operative extremity. A prescription for narcotics was sent to the pharmacy. Patient to will follow up in 7-10 days for first post-operative visit. Written post-operative instructions provided.      Evidence of Infection: No   Complications:  None; patient tolerated the procedure well.    Disposition: PACU - hemodynamically stable.  Condition: stable     Additional Details:     Attending Attestation: I was present and scrubbed for the entire procedure.    Ricardo Ibrahim  Phone Number: [phone]

## 2025-04-23 NOTE — NURSING NOTE
Pt pulled form PACU,A&Ox3. Vac dressing & ace to left foot. Pt denies pain. Vitals stable. Snack provided.

## 2025-04-25 LAB
B-LACTAMASE ORGANISM ISLT: NEGATIVE
BACTERIA SPEC CULT: NORMAL
GRAM STN SPEC: NORMAL
GRAM STN SPEC: NORMAL

## 2025-04-29 ENCOUNTER — APPOINTMENT (OUTPATIENT)
Dept: PRIMARY CARE | Facility: CLINIC | Age: 75
End: 2025-04-29
Payer: MEDICARE

## 2025-04-29 ENCOUNTER — APPOINTMENT (OUTPATIENT)
Dept: INFUSION THERAPY | Facility: CLINIC | Age: 75
End: 2025-04-29
Payer: MEDICARE

## 2025-04-30 ENCOUNTER — LAB (OUTPATIENT)
Dept: LAB | Facility: HOSPITAL | Age: 75
End: 2025-04-30
Payer: MEDICARE

## 2025-04-30 DIAGNOSIS — M81.8 OTHER OSTEOPOROSIS WITHOUT CURRENT PATHOLOGICAL FRACTURE: Primary | ICD-10-CM

## 2025-04-30 LAB
ALBUMIN SERPL BCP-MCNC: 4.1 G/DL (ref 3.4–5)
ALP SERPL-CCNC: 49 U/L (ref 33–136)
ALT SERPL W P-5'-P-CCNC: 21 U/L (ref 7–45)
ANION GAP SERPL CALCULATED.3IONS-SCNC: 11 MMOL/L (ref 10–20)
AST SERPL W P-5'-P-CCNC: 22 U/L (ref 9–39)
BILIRUB SERPL-MCNC: 0.4 MG/DL (ref 0–1.2)
BUN SERPL-MCNC: 18 MG/DL (ref 6–23)
CALCIUM SERPL-MCNC: 9.2 MG/DL (ref 8.6–10.3)
CHLORIDE SERPL-SCNC: 96 MMOL/L (ref 98–107)
CO2 SERPL-SCNC: 32 MMOL/L (ref 21–32)
CREAT SERPL-MCNC: 0.61 MG/DL (ref 0.5–1.05)
EGFRCR SERPLBLD CKD-EPI 2021: >90 ML/MIN/1.73M*2
GLUCOSE SERPL-MCNC: 120 MG/DL (ref 74–99)
POTASSIUM SERPL-SCNC: 3.4 MMOL/L (ref 3.5–5.3)
PROT SERPL-MCNC: 6.6 G/DL (ref 6.4–8.2)
SODIUM SERPL-SCNC: 136 MMOL/L (ref 136–145)

## 2025-04-30 PROCEDURE — 80053 COMPREHEN METABOLIC PANEL: CPT

## 2025-05-02 ENCOUNTER — OFFICE VISIT (OUTPATIENT)
Dept: PRIMARY CARE | Facility: CLINIC | Age: 75
End: 2025-05-02
Payer: MEDICARE

## 2025-05-02 VITALS
OXYGEN SATURATION: 98 % | HEIGHT: 65 IN | WEIGHT: 148 LBS | HEART RATE: 80 BPM | DIASTOLIC BLOOD PRESSURE: 80 MMHG | SYSTOLIC BLOOD PRESSURE: 128 MMHG | TEMPERATURE: 96.9 F | BODY MASS INDEX: 24.66 KG/M2

## 2025-05-02 DIAGNOSIS — Z13.820 ENCOUNTER FOR SCREENING FOR OSTEOPOROSIS: ICD-10-CM

## 2025-05-02 DIAGNOSIS — E87.6 HYPOKALEMIA: ICD-10-CM

## 2025-05-02 DIAGNOSIS — Z23 ENCOUNTER FOR IMMUNIZATION: ICD-10-CM

## 2025-05-02 DIAGNOSIS — Z78.0 MENOPAUSE: ICD-10-CM

## 2025-05-02 DIAGNOSIS — E78.2 MIXED HYPERLIPIDEMIA: ICD-10-CM

## 2025-05-02 DIAGNOSIS — I10 PRIMARY HYPERTENSION: Primary | ICD-10-CM

## 2025-05-02 DIAGNOSIS — R73.9 HYPERGLYCEMIA: ICD-10-CM

## 2025-05-02 DIAGNOSIS — Z86.2 HISTORY OF ANEMIA: ICD-10-CM

## 2025-05-02 DIAGNOSIS — I47.10: ICD-10-CM

## 2025-05-02 DIAGNOSIS — E55.9 VITAMIN D DEFICIENCY: ICD-10-CM

## 2025-05-02 DIAGNOSIS — E03.9 ACQUIRED HYPOTHYROIDISM: ICD-10-CM

## 2025-05-02 PROCEDURE — 90715 TDAP VACCINE 7 YRS/> IM: CPT | Performed by: INTERNAL MEDICINE

## 2025-05-02 PROCEDURE — 3008F BODY MASS INDEX DOCD: CPT | Performed by: INTERNAL MEDICINE

## 2025-05-02 PROCEDURE — 1126F AMNT PAIN NOTED NONE PRSNT: CPT | Performed by: INTERNAL MEDICINE

## 2025-05-02 PROCEDURE — 3079F DIAST BP 80-89 MM HG: CPT | Performed by: INTERNAL MEDICINE

## 2025-05-02 PROCEDURE — 1159F MED LIST DOCD IN RCRD: CPT | Performed by: INTERNAL MEDICINE

## 2025-05-02 PROCEDURE — 99214 OFFICE O/P EST MOD 30 MIN: CPT | Mod: 25 | Performed by: INTERNAL MEDICINE

## 2025-05-02 PROCEDURE — 99214 OFFICE O/P EST MOD 30 MIN: CPT | Performed by: INTERNAL MEDICINE

## 2025-05-02 PROCEDURE — 3074F SYST BP LT 130 MM HG: CPT | Performed by: INTERNAL MEDICINE

## 2025-05-02 RX ORDER — POTASSIUM CHLORIDE 750 MG/1
10 TABLET, FILM COATED, EXTENDED RELEASE ORAL DAILY
Qty: 90 TABLET | Refills: 2 | Status: SHIPPED | OUTPATIENT
Start: 2025-05-02

## 2025-05-02 ASSESSMENT — PAIN SCALES - GENERAL: PAINLEVEL_OUTOF10: 0-NO PAIN

## 2025-05-02 NOTE — PROGRESS NOTES
[facility]: [facility]  PROGRESS NOTE      Amanda Hopkins is a 74 y.o. female that is presenting today for Follow-up.    Assessment/Plan   Diagnoses and all orders for this visit:  Primary hypertension  -     Comprehensive Metabolic Panel; Future  -     Albumin-Creatinine Ratio, Urine Random; Future  Supraventricular tachycardia determined by electrocardiography  Acquired hypothyroidism  -     TSH with reflex to Free T4 if abnormal; Future  Hypokalemia  -     potassium chloride CR (Klor-Con) 10 mEq ER tablet; Take 1 tablet (10 mEq) by mouth once daily. Do not crush, chew, or split.  -     Comprehensive Metabolic Panel; Future  -     Magnesium; Future  Mixed hyperlipidemia  -     Comprehensive Metabolic Panel; Future  -     Lipid Panel; Future  Hyperglycemia  -     Comprehensive Metabolic Panel; Future  -     Hemoglobin A1C; Future  Vitamin D deficiency  -     Vitamin D 25-Hydroxy,Total (for eval of Vitamin D levels); Future  History of anemia  -     CBC and Auto Differential; Future  Menopause  -     XR DEXA bone density; Future  Encounter for screening for osteoporosis  -     XR DEXA bone density; Future  Encounter for immunization  -     Tdap vaccine, age 7 years and older  Other orders  -     Follow Up In Primary Care - Established  -     Follow Up In Primary Care; Future  Subjective   HPI    - Amanda Hopkins 74 y.o. female is here today for FUV / BW results.     Left foot surgery       - Patient denies any symptoms or concerns at this time.       - patient denies any adverse reactions to or concerns with his/her meds.       - Problem list and medication reconciliation done today.  - V.S. Stable. No changes at this time.  - Encouraged continued diet and exercise modification.     Review of Systems   All pertinent POSITIVES as noted per HPI.  All other systems have been reviewed and are NEGATIVE and /or Noncontributory to this patient current visit or complaint.     Objective   Vitals:     "05/02/25 0931   BP: 128/80   Pulse: 80   Temp: 36.1 °C (96.9 °F)   SpO2: 98%      Body mass index is 24.63 kg/m².  Physical Exam  Vitals and nursing note reviewed.   Constitutional:       Appearance: Normal appearance.   HENT:      Head: Normocephalic and atraumatic.   Neck:      Vascular: No carotid bruit.   Cardiovascular:      Rate and Rhythm: Normal rate and regular rhythm.      Pulses: Normal pulses.      Heart sounds: Normal heart sounds.   Pulmonary:      Effort: Pulmonary effort is normal.      Breath sounds: Normal breath sounds.   Abdominal:      General: Abdomen is flat. Bowel sounds are normal.      Palpations: Abdomen is soft.   Musculoskeletal:         General: No swelling. Normal range of motion.      Cervical back: Neck supple.   Lymphadenopathy:      Cervical: No cervical adenopathy.   Skin:     General: Skin is warm and dry.   Neurological:      Mental Status: She is alert.   Psychiatric:         Mood and Affect: Mood normal.       Diagnostic Results   Lab Results   Component Value Date    GLUCOSE 120 (H) 04/30/2025    CALCIUM 9.2 04/30/2025     04/30/2025    K 3.4 (L) 04/30/2025    CO2 32 04/30/2025    CL 96 (L) 04/30/2025    BUN 18 04/30/2025    CREATININE 0.61 04/30/2025     Lab Results   Component Value Date    ALT 21 04/30/2025    AST 22 04/30/2025    ALKPHOS 49 04/30/2025    BILITOT 0.4 04/30/2025     Lab Results   Component Value Date    WBC 7.1 02/10/2025    HGB 11.2 (L) 02/10/2025    HCT 35.5 (L) 02/10/2025    MCV 96 02/10/2025     02/10/2025     Lab Results   Component Value Date    CHOL 243 (H) 11/23/2024    CHOL 229 (H) 11/28/2023     Lab Results   Component Value Date    HDL 64.7 11/23/2024    HDL 73.0 11/28/2023     Lab Results   Component Value Date    LDLCALC 162 (H) 11/23/2024    LDLCALC 140 (H) 11/28/2023     Lab Results   Component Value Date    TRIG 81 11/23/2024    TRIG 81 11/28/2023     No components found for: \"CHOLHDL\"  Lab Results   Component Value Date    " HGBA1C 5.3 11/23/2024     Other labs not included in the list above were reviewed either before or during this encounter.    History    Medical History[1]  Surgical History[2]  Family History[3]  Social History     Socioeconomic History    Marital status:      Spouse name: Not on file    Number of children: Not on file    Years of education: Not on file    Highest education level: Not on file   Occupational History    Not on file   Tobacco Use    Smoking status: Never     Passive exposure: Never    Smokeless tobacco: Never   Vaping Use    Vaping status: Never Used   Substance and Sexual Activity    Alcohol use: Yes    Drug use: Never    Sexual activity: Defer   Other Topics Concern    Not on file   Social History Narrative    Not on file     Social Drivers of Health     Financial Resource Strain: Not on file   Food Insecurity: Not on file   Transportation Needs: Not on file   Physical Activity: Not on file   Stress: Not on file   Social Connections: Not on file   Intimate Partner Violence: Not on file   Housing Stability: Not on file     Allergies[4]  Medications Ordered Prior to Encounter[5]  Immunization History   Administered Date(s) Administered    COVID-19, mRNA, LNP-S, PF, 30 mcg/0.3 mL dose 03/23/2021, 04/13/2021    Flu vaccine, trivalent, preservative free, HIGH-DOSE, age 65y+ (Fluzone) 10/28/2024    Influenza, Unspecified 10/19/2009, 11/05/2012, 12/08/2020    Novel influenza-H1N1-09, preservative-free 11/22/2009    Pneumococcal, Unspecified 12/20/2018    Zoster vaccine, recombinant, adult (SHINGRIX) 12/08/2020, 02/08/2021    Zoster, Unspecified 12/08/2020     Patient's medical history was reviewed and updated either before or during this encounter.       Sami Teran MD       [1]   Past Medical History:  Diagnosis Date    Endometriosis     Other specified diseases of intestine 01/27/2017    Colonic mass    Paroxysmal tachycardia 09/16/2023    Personal history of other diseases of the  musculoskeletal system and connective tissue     History of osteoarthritis    Supraventricular tachycardia     Supraventricular tachycardia by ECG   [2]   Past Surgical History:  Procedure Laterality Date    EYE SURGERY      OTHER SURGICAL HISTORY  01/27/2017    Laryngeal Surgery    OTHER SURGICAL HISTORY  03/11/2020    Colectomy    OTHER SURGICAL HISTORY  04/10/2017    Laparoscopy Partial Colectomy    TONSILLECTOMY  01/27/2017    Tonsillectomy    TOTAL ABDOMINAL HYSTERECTOMY W/ BILATERAL SALPINGOOPHORECTOMY  01/27/2017    Total Abdominal Hysterectomy With Removal Of Both Ovaries   [3]   Family History  Problem Relation Name Age of Onset    Other (Cerebral hemorrhage) Mother      Heart disease Father      Other (OHS) Brother     [4]   Allergies  Allergen Reactions    Cephalexin Itching    Doxycycline Other     Trouble swallowing    Morphine Unknown    Propoxyphene N-Acetaminophen Unknown    Propoxyphene Nausea And Vomiting and Unknown   [5]   Current Outpatient Medications on File Prior to Visit   Medication Sig Dispense Refill    aspirin 81 mg chewable tablet Chew 1 tablet (81 mg) once daily.      atenoloL-chlorthalidone (Tenoretic 100) 100-25 mg tablet Take 1 tablet by mouth once daily. 90 tablet 3    atorvastatin (Lipitor) 40 mg tablet Take 1 tablet (40 mg) by mouth once daily. 90 tablet 3    cholecalciferol (Vitamin D3) 25 MCG (1000 UT) tablet Take 1 tablet (25 mcg) by mouth once daily.      ciprofloxacin (Cipro) 500 mg tablet Take 1 tablet (500 mg) by mouth 2 times a day for 10 days. 20 tablet 0    clindamycin (Cleocin) 300 mg capsule Take 1 capsule (300 mg) by mouth 4 times a day for 10 days. 40 capsule 0    colestipol (Colestid) 1 gram tablet Take 1 tablet (1 g) by mouth once daily.      folic acid (Folvite) 400 mcg tablet Take 1 tablet (0.4 mg) by mouth once daily. 30 tablet 2    glucos sul 2KCl/msm/chond/C/Mn (GLUCOSAMINE CHONDROITIN ORAL) Take 1 tablet by mouth once daily. Glucosamine Chondr 1500 Complx       lansoprazole (Prevacid) 30 mg DR capsule Take 1 capsule (30 mg) by mouth once daily. Do not crush or chew. 90 capsule 2    leflunomide (Arava) 20 mg tablet Take 1 tablet (20 mg) by mouth once daily. 90 tablet 0    meloxicam (Mobic) 15 mg tablet Take 1 tablet (15 mg) by mouth once daily. 90 tablet 0    methotrexate (Trexall) 2.5 mg tablet Take 6 tablets (15 mg total) by mouth 1 (one) time per week.      oxybutynin XL (Ditropan-XL) 15 mg 24 hr tablet Take 1 tablet (15 mg) by mouth once daily.      oxyCODONE (Roxicodone) 5 mg immediate release tablet Take 1 tablet (5 mg) by mouth every 4 hours if needed for severe pain (7 - 10). 30 tablet 0    denosumab (Prolia) 60 mg/mL syringe Inject 1 mL (60 mg total) under the skin every 6 months. (Patient not taking: Reported on 3/26/2025) 1 mL 1    dicyclomine (Bentyl) 10 mg capsule Take 2 capsules (20 mg) by mouth 3 times a day. prn (Patient not taking: Reported on 5/2/2025)      ferrous sulfate, 325 mg ferrous sulfate, tablet Take 1 tablet (325 mg) by mouth once daily with breakfast. (Patient not taking: Reported on 5/2/2025)      fluticasone (Flonase) 50 mcg/actuation nasal spray Administer 1 spray into each nostril once daily. Shake gently. Before first use, prime pump. After use, clean tip and replace cap. (Patient not taking: Reported on 5/2/2025) 16 g 5    hydrocortisone-iodoquinoL (Dermazene) 1-1 % cream in packet Dermazene 1-1 % External Cream APPLY EVERY DAY AS DIRECTED Quantity: 28.4 Refills: 0 Jose Angel Hampton MD Start : 31-Mar-2017 Active (Patient not taking: Reported on 5/2/2025)      pilocarpine (Salagen, pilocarpine,) 5 mg tablet Take 1 tablet (5 mg) by mouth 3 times a day. (Patient not taking: Reported on 5/2/2025) 90 tablet 3    traMADol (Ultram) 50 mg tablet Take 1 tablet (50 mg) by mouth every 8 hours if needed for severe pain (7 - 10) for up to 21 doses. (Patient not taking: Reported on 5/2/2025) 21 tablet 0     No current facility-administered medications on  file prior to visit.

## 2025-05-09 ENCOUNTER — OFFICE VISIT (OUTPATIENT)
Dept: RHEUMATOLOGY | Facility: CLINIC | Age: 75
End: 2025-05-09
Payer: MEDICARE

## 2025-05-09 VITALS — DIASTOLIC BLOOD PRESSURE: 68 MMHG | BODY MASS INDEX: 24.46 KG/M2 | SYSTOLIC BLOOD PRESSURE: 112 MMHG | WEIGHT: 147 LBS

## 2025-05-09 DIAGNOSIS — M06.9 RHEUMATOID ARTHRITIS, INVOLVING UNSPECIFIED SITE, UNSPECIFIED WHETHER RHEUMATOID FACTOR PRESENT (MULTI): ICD-10-CM

## 2025-05-09 DIAGNOSIS — M81.0 SENILE OSTEOPOROSIS: ICD-10-CM

## 2025-05-09 DIAGNOSIS — M15.9 OSTEOARTHRITIS OF MULTIPLE JOINTS, UNSPECIFIED OSTEOARTHRITIS TYPE: Primary | ICD-10-CM

## 2025-05-09 PROCEDURE — 3074F SYST BP LT 130 MM HG: CPT | Performed by: INTERNAL MEDICINE

## 2025-05-09 PROCEDURE — 99214 OFFICE O/P EST MOD 30 MIN: CPT | Performed by: INTERNAL MEDICINE

## 2025-05-09 PROCEDURE — 1159F MED LIST DOCD IN RCRD: CPT | Performed by: INTERNAL MEDICINE

## 2025-05-09 PROCEDURE — 3078F DIAST BP <80 MM HG: CPT | Performed by: INTERNAL MEDICINE

## 2025-05-09 NOTE — PROGRESS NOTES
"Recheck  OA  /  RA  /  OP Prolia in April )  Recent Bunionectomy left foot with chronic infection.  Off Methotrexate x 3 months.   Continued use of leflunomide.    HPI - She has chronic back pain - she saw pain mgmt and had injection with relief, but it's worn off.  She took 2 aleve this AM despite taking meloxicam.  She is off Methotrexate because she has a sm non-healing sore on her foot.  She had titaium removed and pins put in.  No other pain.  No CP, resp, or GI    PE  NAD  RRR no r/m/g  CTA  No edema  No synovitis  L foot in boot    A/P - OA and \"seropositive\" RA per prev rheum but with neg RF and CCP Ab, doing well on lef without Methotrexate  Reiterated no OTC NSAIDs with meloxicam.  She should call pain mgmt about her back pain.    Reviewed recent CMP - minimal chelo KUMAR  Will check CBC  OP on prolia  Follow up 3 mo  "

## 2025-05-13 DIAGNOSIS — M06.9 RHEUMATOID ARTHRITIS, INVOLVING UNSPECIFIED SITE, UNSPECIFIED WHETHER RHEUMATOID FACTOR PRESENT (MULTI): ICD-10-CM

## 2025-05-13 RX ORDER — MELOXICAM 15 MG/1
15 TABLET ORAL DAILY
Qty: 90 TABLET | Refills: 0 | Status: SHIPPED | OUTPATIENT
Start: 2025-05-13

## 2025-05-14 ENCOUNTER — APPOINTMENT (OUTPATIENT)
Facility: CLINIC | Age: 75
End: 2025-05-14
Payer: MEDICARE

## 2025-05-23 LAB
BASOPHILS # BLD AUTO: 102 CELLS/UL (ref 0–200)
BASOPHILS NFR BLD AUTO: 1.6 %
EOSINOPHIL # BLD AUTO: 173 CELLS/UL (ref 15–500)
EOSINOPHIL NFR BLD AUTO: 2.7 %
ERYTHROCYTE [DISTWIDTH] IN BLOOD BY AUTOMATED COUNT: 12.3 % (ref 11–15)
HCT VFR BLD AUTO: 36.5 % (ref 35–45)
HGB BLD-MCNC: 11.8 G/DL (ref 11.7–15.5)
LYMPHOCYTES # BLD AUTO: 1734 CELLS/UL (ref 850–3900)
LYMPHOCYTES NFR BLD AUTO: 27.1 %
MCH RBC QN AUTO: 31 PG (ref 27–33)
MCHC RBC AUTO-ENTMCNC: 32.3 G/DL (ref 32–36)
MCV RBC AUTO: 95.8 FL (ref 80–100)
MONOCYTES # BLD AUTO: 582 CELLS/UL (ref 200–950)
MONOCYTES NFR BLD AUTO: 9.1 %
NEUTROPHILS # BLD AUTO: 3808 CELLS/UL (ref 1500–7800)
NEUTROPHILS NFR BLD AUTO: 59.5 %
PLATELET # BLD AUTO: 336 THOUSAND/UL (ref 140–400)
PMV BLD REES-ECKER: 9.9 FL (ref 7.5–12.5)
RBC # BLD AUTO: 3.81 MILLION/UL (ref 3.8–5.1)
WBC # BLD AUTO: 6.4 THOUSAND/UL (ref 3.8–10.8)

## 2025-05-23 RX ORDER — HYDROCORTISONE AND IODOQUINOL 10; 10 MG/G; MG/G
CREAM TOPICAL
Qty: 28.4 G | Refills: 0 | OUTPATIENT
Start: 2025-05-23

## 2025-05-26 DIAGNOSIS — M06.9 RHEUMATOID ARTHRITIS, INVOLVING UNSPECIFIED SITE, UNSPECIFIED WHETHER RHEUMATOID FACTOR PRESENT (MULTI): ICD-10-CM

## 2025-05-27 RX ORDER — LEFLUNOMIDE 20 MG/1
20 TABLET ORAL DAILY
Qty: 90 TABLET | Refills: 0 | Status: SHIPPED | OUTPATIENT
Start: 2025-05-27

## 2025-06-26 DIAGNOSIS — M06.9 RHEUMATOID ARTHRITIS, INVOLVING UNSPECIFIED SITE, UNSPECIFIED WHETHER RHEUMATOID FACTOR PRESENT (MULTI): ICD-10-CM

## 2025-06-26 RX ORDER — PILOCARPINE HYDROCHLORIDE 5 MG/1
5 TABLET, FILM COATED ORAL 3 TIMES DAILY
Qty: 90 TABLET | Refills: 0 | Status: SHIPPED | OUTPATIENT
Start: 2025-06-26 | End: 2026-06-26

## 2025-07-09 ENCOUNTER — APPOINTMENT (OUTPATIENT)
Age: 75
End: 2025-07-09
Payer: MEDICARE

## 2025-07-21 DIAGNOSIS — K21.9 GASTROESOPHAGEAL REFLUX DISEASE WITHOUT ESOPHAGITIS: ICD-10-CM

## 2025-07-21 DIAGNOSIS — M81.8 OTHER OSTEOPOROSIS WITHOUT CURRENT PATHOLOGICAL FRACTURE: Primary | ICD-10-CM

## 2025-07-22 RX ORDER — LANSOPRAZOLE 30 MG/1
30 CAPSULE, DELAYED RELEASE ORAL DAILY
Qty: 90 CAPSULE | Refills: 1 | Status: SHIPPED | OUTPATIENT
Start: 2025-07-22

## 2025-08-15 ENCOUNTER — OFFICE VISIT (OUTPATIENT)
Dept: RHEUMATOLOGY | Facility: CLINIC | Age: 75
End: 2025-08-15
Payer: MEDICARE

## 2025-08-15 VITALS — SYSTOLIC BLOOD PRESSURE: 122 MMHG | BODY MASS INDEX: 24.48 KG/M2 | DIASTOLIC BLOOD PRESSURE: 70 MMHG | WEIGHT: 147.1 LBS

## 2025-08-15 DIAGNOSIS — M15.9 OSTEOARTHRITIS OF MULTIPLE JOINTS, UNSPECIFIED OSTEOARTHRITIS TYPE: ICD-10-CM

## 2025-08-15 DIAGNOSIS — M06.9 RHEUMATOID ARTHRITIS, INVOLVING UNSPECIFIED SITE, UNSPECIFIED WHETHER RHEUMATOID FACTOR PRESENT (MULTI): Primary | ICD-10-CM

## 2025-08-15 DIAGNOSIS — Z79.899 LONG-TERM USE OF HIGH-RISK MEDICATION: ICD-10-CM

## 2025-08-15 DIAGNOSIS — M81.0 SENILE OSTEOPOROSIS: ICD-10-CM

## 2025-08-15 PROCEDURE — 3078F DIAST BP <80 MM HG: CPT | Performed by: INTERNAL MEDICINE

## 2025-08-15 PROCEDURE — 99214 OFFICE O/P EST MOD 30 MIN: CPT | Performed by: INTERNAL MEDICINE

## 2025-08-15 PROCEDURE — 3074F SYST BP LT 130 MM HG: CPT | Performed by: INTERNAL MEDICINE

## 2025-08-18 ENCOUNTER — APPOINTMENT (OUTPATIENT)
Age: 75
End: 2025-08-18
Payer: MEDICARE

## 2025-08-18 VITALS
DIASTOLIC BLOOD PRESSURE: 70 MMHG | BODY MASS INDEX: 23.82 KG/M2 | RESPIRATION RATE: 14 BRPM | WEIGHT: 143 LBS | HEIGHT: 65 IN | SYSTOLIC BLOOD PRESSURE: 118 MMHG | HEART RATE: 90 BPM | OXYGEN SATURATION: 99 %

## 2025-08-18 DIAGNOSIS — E78.2 MIXED HYPERLIPIDEMIA: ICD-10-CM

## 2025-08-18 DIAGNOSIS — M06.9 RHEUMATOID ARTHRITIS INVOLVING BOTH HANDS, UNSPECIFIED WHETHER RHEUMATOID FACTOR PRESENT (MULTI): ICD-10-CM

## 2025-08-18 DIAGNOSIS — Z01.818 PREOPERATIVE CLEARANCE: ICD-10-CM

## 2025-08-18 DIAGNOSIS — I47.10: ICD-10-CM

## 2025-08-18 DIAGNOSIS — I10 PRIMARY HYPERTENSION: Primary | ICD-10-CM

## 2025-08-18 DIAGNOSIS — E87.6 HYPOKALEMIA: ICD-10-CM

## 2025-08-18 DIAGNOSIS — E03.9 ACQUIRED HYPOTHYROIDISM: ICD-10-CM

## 2025-08-18 DIAGNOSIS — I47.10 SVT (SUPRAVENTRICULAR TACHYCARDIA): ICD-10-CM

## 2025-08-18 PROCEDURE — 99214 OFFICE O/P EST MOD 30 MIN: CPT | Performed by: INTERNAL MEDICINE

## 2025-08-18 PROCEDURE — 1125F AMNT PAIN NOTED PAIN PRSNT: CPT | Performed by: INTERNAL MEDICINE

## 2025-08-18 PROCEDURE — 3078F DIAST BP <80 MM HG: CPT | Performed by: INTERNAL MEDICINE

## 2025-08-18 PROCEDURE — 1159F MED LIST DOCD IN RCRD: CPT | Performed by: INTERNAL MEDICINE

## 2025-08-18 PROCEDURE — 1036F TOBACCO NON-USER: CPT | Performed by: INTERNAL MEDICINE

## 2025-08-18 PROCEDURE — 3074F SYST BP LT 130 MM HG: CPT | Performed by: INTERNAL MEDICINE

## 2025-08-18 PROCEDURE — G2211 COMPLEX E/M VISIT ADD ON: HCPCS | Performed by: INTERNAL MEDICINE

## 2025-08-18 PROCEDURE — 3008F BODY MASS INDEX DOCD: CPT | Performed by: INTERNAL MEDICINE

## 2025-08-18 RX ORDER — ATENOLOL AND CHLORTHALIDONE TABLET 100; 25 MG/1; MG/1
1 TABLET ORAL DAILY
Qty: 90 TABLET | Refills: 3 | Status: SHIPPED | OUTPATIENT
Start: 2025-08-18 | End: 2026-08-18

## 2025-08-18 RX ORDER — ATORVASTATIN CALCIUM 40 MG/1
40 TABLET, FILM COATED ORAL DAILY
Qty: 90 TABLET | Refills: 3 | Status: SHIPPED | OUTPATIENT
Start: 2025-08-18 | End: 2026-08-18

## 2025-08-18 RX ORDER — POTASSIUM CHLORIDE 750 MG/1
10 TABLET, FILM COATED, EXTENDED RELEASE ORAL DAILY
Qty: 90 TABLET | Refills: 2 | Status: SHIPPED | OUTPATIENT
Start: 2025-08-18

## 2025-08-18 ASSESSMENT — LIFESTYLE VARIABLES
AUDIT-C TOTAL SCORE: 1
HOW OFTEN DO YOU HAVE SIX OR MORE DRINKS ON ONE OCCASION: NEVER
SKIP TO QUESTIONS 9-10: 1
HOW OFTEN DO YOU HAVE A DRINK CONTAINING ALCOHOL: MONTHLY OR LESS
HAVE YOU OR SOMEONE ELSE BEEN INJURED AS A RESULT OF YOUR DRINKING: NO
HOW MANY STANDARD DRINKS CONTAINING ALCOHOL DO YOU HAVE ON A TYPICAL DAY: 1 OR 2
AUDIT TOTAL SCORE: 1
HAS A RELATIVE, FRIEND, DOCTOR, OR ANOTHER HEALTH PROFESSIONAL EXPRESSED CONCERN ABOUT YOUR DRINKING OR SUGGESTED YOU CUT DOWN: NO

## 2025-08-18 ASSESSMENT — ENCOUNTER SYMPTOMS
OCCASIONAL FEELINGS OF UNSTEADINESS: 0
DEPRESSION: 0
LOSS OF SENSATION IN FEET: 0

## 2025-08-18 ASSESSMENT — PAIN SCALES - GENERAL: PAINLEVEL_OUTOF10: 5

## 2025-08-21 ENCOUNTER — TELEPHONE (OUTPATIENT)
Dept: INFUSION THERAPY | Facility: CLINIC | Age: 75
End: 2025-08-21
Payer: MEDICARE

## 2025-08-22 ENCOUNTER — APPOINTMENT (OUTPATIENT)
Dept: INFUSION THERAPY | Facility: CLINIC | Age: 75
End: 2025-08-22
Payer: MEDICARE

## 2025-08-23 LAB
ALBUMIN SERPL-MCNC: 4.4 G/DL (ref 3.6–5.1)
ALP SERPL-CCNC: 46 U/L (ref 37–153)
ALT SERPL-CCNC: 18 U/L (ref 6–29)
ANION GAP SERPL CALCULATED.4IONS-SCNC: 8 MMOL/L (CALC) (ref 7–17)
AST SERPL-CCNC: 19 U/L (ref 10–35)
BASOPHILS # BLD AUTO: 69 CELLS/UL (ref 0–200)
BASOPHILS NFR BLD AUTO: 1 %
BILIRUB SERPL-MCNC: 0.4 MG/DL (ref 0.2–1.2)
BUN SERPL-MCNC: 15 MG/DL (ref 7–25)
CALCIUM SERPL-MCNC: 9.4 MG/DL (ref 8.6–10.4)
CHLORIDE SERPL-SCNC: 100 MMOL/L (ref 98–110)
CO2 SERPL-SCNC: 29 MMOL/L (ref 20–32)
CREAT SERPL-MCNC: 0.56 MG/DL (ref 0.6–1)
CRP SERPL-MCNC: NORMAL MG/L
EGFRCR SERPLBLD CKD-EPI 2021: 96 ML/MIN/1.73M2
EOSINOPHIL # BLD AUTO: 104 CELLS/UL (ref 15–500)
EOSINOPHIL NFR BLD AUTO: 1.5 %
ERYTHROCYTE [DISTWIDTH] IN BLOOD BY AUTOMATED COUNT: 12.6 % (ref 11–15)
GLUCOSE SERPL-MCNC: 133 MG/DL (ref 65–139)
HCT VFR BLD AUTO: 34.6 % (ref 35–45)
HGB BLD-MCNC: 11.1 G/DL (ref 11.7–15.5)
LYMPHOCYTES # BLD AUTO: 1484 CELLS/UL (ref 850–3900)
LYMPHOCYTES NFR BLD AUTO: 21.5 %
MCH RBC QN AUTO: 30.7 PG (ref 27–33)
MCHC RBC AUTO-ENTMCNC: 32.1 G/DL (ref 32–36)
MCV RBC AUTO: 95.8 FL (ref 80–100)
MONOCYTES # BLD AUTO: 352 CELLS/UL (ref 200–950)
MONOCYTES NFR BLD AUTO: 5.1 %
NEUTROPHILS # BLD AUTO: 4892 CELLS/UL (ref 1500–7800)
NEUTROPHILS NFR BLD AUTO: 70.9 %
PLATELET # BLD AUTO: 268 THOUSAND/UL (ref 140–400)
PMV BLD REES-ECKER: 9.8 FL (ref 7.5–12.5)
POTASSIUM SERPL-SCNC: 3.6 MMOL/L (ref 3.5–5.3)
PROT SERPL-MCNC: 6.8 G/DL (ref 6.1–8.1)
RBC # BLD AUTO: 3.61 MILLION/UL (ref 3.8–5.1)
SODIUM SERPL-SCNC: 137 MMOL/L (ref 135–146)
WBC # BLD AUTO: 6.9 THOUSAND/UL (ref 3.8–10.8)

## 2025-08-25 DIAGNOSIS — R79.9 ABNORMAL BLOOD CHEMISTRY: ICD-10-CM

## 2025-08-25 LAB
ALBUMIN SERPL-MCNC: 4.4 G/DL (ref 3.6–5.1)
ALP SERPL-CCNC: 46 U/L (ref 37–153)
ALT SERPL-CCNC: 18 U/L (ref 6–29)
ANION GAP SERPL CALCULATED.4IONS-SCNC: 8 MMOL/L (CALC) (ref 7–17)
AST SERPL-CCNC: 19 U/L (ref 10–35)
BASOPHILS # BLD AUTO: 69 CELLS/UL (ref 0–200)
BASOPHILS NFR BLD AUTO: 1 %
BILIRUB SERPL-MCNC: 0.4 MG/DL (ref 0.2–1.2)
BUN SERPL-MCNC: 15 MG/DL (ref 7–25)
CALCIUM SERPL-MCNC: 9.4 MG/DL (ref 8.6–10.4)
CHLORIDE SERPL-SCNC: 100 MMOL/L (ref 98–110)
CO2 SERPL-SCNC: 29 MMOL/L (ref 20–32)
CREAT SERPL-MCNC: 0.56 MG/DL (ref 0.6–1)
CRP SERPL-MCNC: <3 MG/L
EGFRCR SERPLBLD CKD-EPI 2021: 96 ML/MIN/1.73M2
EOSINOPHIL # BLD AUTO: 104 CELLS/UL (ref 15–500)
EOSINOPHIL NFR BLD AUTO: 1.5 %
ERYTHROCYTE [DISTWIDTH] IN BLOOD BY AUTOMATED COUNT: 12.6 % (ref 11–15)
GLUCOSE SERPL-MCNC: 133 MG/DL (ref 65–139)
HCT VFR BLD AUTO: 34.6 % (ref 35–45)
HGB BLD-MCNC: 11.1 G/DL (ref 11.7–15.5)
LYMPHOCYTES # BLD AUTO: 1484 CELLS/UL (ref 850–3900)
LYMPHOCYTES NFR BLD AUTO: 21.5 %
MCH RBC QN AUTO: 30.7 PG (ref 27–33)
MCHC RBC AUTO-ENTMCNC: 32.1 G/DL (ref 32–36)
MCV RBC AUTO: 95.8 FL (ref 80–100)
MONOCYTES # BLD AUTO: 352 CELLS/UL (ref 200–950)
MONOCYTES NFR BLD AUTO: 5.1 %
NEUTROPHILS # BLD AUTO: 4892 CELLS/UL (ref 1500–7800)
NEUTROPHILS NFR BLD AUTO: 70.9 %
PLATELET # BLD AUTO: 268 THOUSAND/UL (ref 140–400)
PMV BLD REES-ECKER: 9.8 FL (ref 7.5–12.5)
POTASSIUM SERPL-SCNC: 3.6 MMOL/L (ref 3.5–5.3)
PROT SERPL-MCNC: 6.8 G/DL (ref 6.1–8.1)
RBC # BLD AUTO: 3.61 MILLION/UL (ref 3.8–5.1)
SODIUM SERPL-SCNC: 137 MMOL/L (ref 135–146)
WBC # BLD AUTO: 6.9 THOUSAND/UL (ref 3.8–10.8)

## 2025-09-02 ENCOUNTER — APPOINTMENT (OUTPATIENT)
Dept: INFUSION THERAPY | Facility: CLINIC | Age: 75
End: 2025-09-02
Payer: MEDICARE

## 2025-09-12 ENCOUNTER — APPOINTMENT (OUTPATIENT)
Dept: INFUSION THERAPY | Facility: CLINIC | Age: 75
End: 2025-09-12
Payer: MEDICARE

## 2025-09-19 ENCOUNTER — APPOINTMENT (OUTPATIENT)
Dept: INFUSION THERAPY | Facility: CLINIC | Age: 75
End: 2025-09-19
Payer: MEDICARE

## 2026-02-02 ENCOUNTER — APPOINTMENT (OUTPATIENT)
Age: 76
End: 2026-02-02
Payer: MEDICARE

## 2026-02-23 ENCOUNTER — APPOINTMENT (OUTPATIENT)
Dept: INFUSION THERAPY | Facility: CLINIC | Age: 76
End: 2026-02-23
Payer: MEDICARE

## 2026-03-09 ENCOUNTER — APPOINTMENT (OUTPATIENT)
Dept: INFUSION THERAPY | Facility: CLINIC | Age: 76
End: 2026-03-09
Payer: MEDICARE

## 2026-03-16 ENCOUNTER — APPOINTMENT (OUTPATIENT)
Dept: INFUSION THERAPY | Facility: CLINIC | Age: 76
End: 2026-03-16
Payer: MEDICARE

## (undated) DEVICE — COVER, XRAY, CASSETTE, 21 X 40 IN, STERILE

## (undated) DEVICE — DRAPE, IRRIGATION, W/POUCH, ADHESIVE STRIP, STERI DRAPE, 19 X 23 IN, DISPOSABLE, STERILE

## (undated) DEVICE — NEEDLE, SPINAL, QUINCKE, 18 G X 3.5 IN, PINK HUB

## (undated) DEVICE — Device

## (undated) DEVICE — STAPLER, SKIN, PLUS, WIDE, 35

## (undated) DEVICE — TUBING, SUCTION, OTOMED

## (undated) DEVICE — BAG, DECANTER

## (undated) DEVICE — SYSTEM KIT, INCISION, PREVENA PEEL AND PLACE, 13CM

## (undated) DEVICE — SUTURE, MONOCRYL, 3-0, 27 IN, SH, UNDYED

## (undated) DEVICE — SALINE NORMAL (100/PK)

## (undated) DEVICE — SUTURE, ETHILON, 3-0, 18 IN, PS1, BLACK

## (undated) DEVICE — GLOVE, SURGICAL, PROTEXIS PI ORTHO, 7.5, PF, LF

## (undated) DEVICE — GLOVE, SURGICAL, POLYISOPRENE ORTHO, SZ 7.5